# Patient Record
Sex: FEMALE | URBAN - METROPOLITAN AREA
[De-identification: names, ages, dates, MRNs, and addresses within clinical notes are randomized per-mention and may not be internally consistent; named-entity substitution may affect disease eponyms.]

---

## 2018-02-22 ENCOUNTER — RECORDS - HEALTHEAST (OUTPATIENT)
Dept: LAB | Facility: CLINIC | Age: 78
End: 2018-02-22

## 2018-02-22 LAB
ANION GAP SERPL CALCULATED.3IONS-SCNC: 10 MMOL/L (ref 5–18)
BUN SERPL-MCNC: 20 MG/DL (ref 8–28)
CALCIUM SERPL-MCNC: 8.9 MG/DL (ref 8.5–10.5)
CHLORIDE BLD-SCNC: 100 MMOL/L (ref 98–107)
CO2 SERPL-SCNC: 26 MMOL/L (ref 22–31)
CREAT SERPL-MCNC: 0.9 MG/DL (ref 0.6–1.1)
ERYTHROCYTE [DISTWIDTH] IN BLOOD BY AUTOMATED COUNT: 13 % (ref 11–14.5)
GFR SERPL CREATININE-BSD FRML MDRD: >60 ML/MIN/1.73M2
GLUCOSE BLD-MCNC: 126 MG/DL (ref 70–125)
HCT VFR BLD AUTO: 37.8 % (ref 35–47)
HGB BLD-MCNC: 12.2 G/DL (ref 12–16)
MCH RBC QN AUTO: 30 PG (ref 27–34)
MCHC RBC AUTO-ENTMCNC: 32.3 G/DL (ref 32–36)
MCV RBC AUTO: 93 FL (ref 80–100)
PLATELET # BLD AUTO: 274 THOU/UL (ref 140–440)
PMV BLD AUTO: 10.8 FL (ref 8.5–12.5)
POTASSIUM BLD-SCNC: 3.9 MMOL/L (ref 3.5–5)
RBC # BLD AUTO: 4.07 MILL/UL (ref 3.8–5.4)
SODIUM SERPL-SCNC: 136 MMOL/L (ref 136–145)
WBC: 6.8 THOU/UL (ref 4–11)

## 2018-07-19 ENCOUNTER — RECORDS - HEALTHEAST (OUTPATIENT)
Dept: LAB | Facility: CLINIC | Age: 78
End: 2018-07-19

## 2018-07-19 LAB
ANION GAP SERPL CALCULATED.3IONS-SCNC: 8 MMOL/L (ref 5–18)
BUN SERPL-MCNC: 24 MG/DL (ref 8–28)
CALCIUM SERPL-MCNC: 9.5 MG/DL (ref 8.5–10.5)
CHLORIDE BLD-SCNC: 105 MMOL/L (ref 98–107)
CO2 SERPL-SCNC: 26 MMOL/L (ref 22–31)
CREAT SERPL-MCNC: 1 MG/DL (ref 0.6–1.1)
ERYTHROCYTE [DISTWIDTH] IN BLOOD BY AUTOMATED COUNT: 13.4 % (ref 11–14.5)
GFR SERPL CREATININE-BSD FRML MDRD: 54 ML/MIN/1.73M2
GLUCOSE BLD-MCNC: 70 MG/DL (ref 70–125)
HCT VFR BLD AUTO: 39.8 % (ref 35–47)
HGB BLD-MCNC: 13.2 G/DL (ref 12–16)
MCH RBC QN AUTO: 30.4 PG (ref 27–34)
MCHC RBC AUTO-ENTMCNC: 33.2 G/DL (ref 32–36)
MCV RBC AUTO: 92 FL (ref 80–100)
PLATELET # BLD AUTO: 249 THOU/UL (ref 140–440)
PMV BLD AUTO: 9.5 FL (ref 8.5–12.5)
POTASSIUM BLD-SCNC: 4.2 MMOL/L (ref 3.5–5)
RBC # BLD AUTO: 4.34 MILL/UL (ref 3.8–5.4)
SODIUM SERPL-SCNC: 139 MMOL/L (ref 136–145)
TSH SERPL DL<=0.005 MIU/L-ACNC: 0.16 UIU/ML (ref 0.3–5)
WBC: 7.2 THOU/UL (ref 4–11)

## 2018-07-20 ENCOUNTER — RECORDS - HEALTHEAST (OUTPATIENT)
Dept: LAB | Facility: CLINIC | Age: 78
End: 2018-07-20

## 2018-07-20 LAB
25(OH)D3 SERPL-MCNC: 22.8 NG/ML (ref 30–80)
T3FREE SERPL-MCNC: 2.4 PG/ML (ref 1.9–3.9)
T4 FREE SERPL-MCNC: 0.9 NG/DL (ref 0.7–1.8)

## 2018-11-19 ENCOUNTER — RECORDS - HEALTHEAST (OUTPATIENT)
Dept: LAB | Facility: CLINIC | Age: 78
End: 2018-11-19

## 2018-11-20 LAB
ANION GAP SERPL CALCULATED.3IONS-SCNC: 11 MMOL/L (ref 5–18)
BUN SERPL-MCNC: 29 MG/DL (ref 8–28)
CALCIUM SERPL-MCNC: 9.8 MG/DL (ref 8.5–10.5)
CHLORIDE BLD-SCNC: 96 MMOL/L (ref 98–107)
CO2 SERPL-SCNC: 27 MMOL/L (ref 22–31)
CREAT SERPL-MCNC: 1.1 MG/DL (ref 0.6–1.1)
GFR SERPL CREATININE-BSD FRML MDRD: 48 ML/MIN/1.73M2
GLUCOSE BLD-MCNC: 112 MG/DL (ref 70–125)
POTASSIUM BLD-SCNC: 3.9 MMOL/L (ref 3.5–5)
SODIUM SERPL-SCNC: 134 MMOL/L (ref 136–145)

## 2019-01-17 ENCOUNTER — RECORDS - HEALTHEAST (OUTPATIENT)
Dept: LAB | Facility: CLINIC | Age: 79
End: 2019-01-17

## 2019-01-17 LAB
ALBUMIN SERPL-MCNC: 3.6 G/DL (ref 3.5–5)
ALP SERPL-CCNC: 57 U/L (ref 45–120)
ALT SERPL W P-5'-P-CCNC: 18 U/L (ref 0–45)
ANION GAP SERPL CALCULATED.3IONS-SCNC: 13 MMOL/L (ref 5–18)
AST SERPL W P-5'-P-CCNC: 20 U/L (ref 0–40)
BILIRUB SERPL-MCNC: 0.6 MG/DL (ref 0–1)
BUN SERPL-MCNC: 35 MG/DL (ref 8–28)
CALCIUM SERPL-MCNC: 9.8 MG/DL (ref 8.5–10.5)
CHLORIDE BLD-SCNC: 97 MMOL/L (ref 98–107)
CO2 SERPL-SCNC: 27 MMOL/L (ref 22–31)
CREAT SERPL-MCNC: 1.23 MG/DL (ref 0.6–1.1)
ERYTHROCYTE [DISTWIDTH] IN BLOOD BY AUTOMATED COUNT: 14.3 % (ref 11–14.5)
GFR SERPL CREATININE-BSD FRML MDRD: 42 ML/MIN/1.73M2
GLUCOSE BLD-MCNC: 109 MG/DL (ref 70–125)
HCT VFR BLD AUTO: 38.2 % (ref 35–47)
HGB BLD-MCNC: 12.2 G/DL (ref 12–16)
MCH RBC QN AUTO: 29.7 PG (ref 27–34)
MCHC RBC AUTO-ENTMCNC: 31.9 G/DL (ref 32–36)
MCV RBC AUTO: 93 FL (ref 80–100)
PLATELET # BLD AUTO: 248 THOU/UL (ref 140–440)
PMV BLD AUTO: 10.1 FL (ref 8.5–12.5)
POTASSIUM BLD-SCNC: 3.8 MMOL/L (ref 3.5–5)
PROT SERPL-MCNC: 7.4 G/DL (ref 6–8)
RBC # BLD AUTO: 4.11 MILL/UL (ref 3.8–5.4)
SODIUM SERPL-SCNC: 137 MMOL/L (ref 136–145)
TSH SERPL DL<=0.005 MIU/L-ACNC: 0.54 UIU/ML (ref 0.3–5)
VIT B12 SERPL-MCNC: 308 PG/ML (ref 213–816)
WBC: 6.2 THOU/UL (ref 4–11)

## 2019-02-14 ENCOUNTER — RECORDS - HEALTHEAST (OUTPATIENT)
Dept: LAB | Facility: CLINIC | Age: 79
End: 2019-02-14

## 2019-02-14 LAB
ALBUMIN SERPL-MCNC: 3.5 G/DL (ref 3.5–5)
ANION GAP SERPL CALCULATED.3IONS-SCNC: 13 MMOL/L (ref 5–18)
BUN SERPL-MCNC: 26 MG/DL (ref 8–28)
CALCIUM SERPL-MCNC: 9.2 MG/DL (ref 8.5–10.5)
CHLORIDE BLD-SCNC: 100 MMOL/L (ref 98–107)
CO2 SERPL-SCNC: 26 MMOL/L (ref 22–31)
CREAT SERPL-MCNC: 1.1 MG/DL (ref 0.6–1.1)
ERYTHROCYTE [DISTWIDTH] IN BLOOD BY AUTOMATED COUNT: 14.6 % (ref 11–14.5)
GFR SERPL CREATININE-BSD FRML MDRD: 48 ML/MIN/1.73M2
GLUCOSE BLD-MCNC: 94 MG/DL (ref 70–125)
HCT VFR BLD AUTO: 37.3 % (ref 35–47)
HGB BLD-MCNC: 12.1 G/DL (ref 12–16)
MCH RBC QN AUTO: 30.7 PG (ref 27–34)
MCHC RBC AUTO-ENTMCNC: 32.4 G/DL (ref 32–36)
MCV RBC AUTO: 95 FL (ref 80–100)
PHOSPHATE SERPL-MCNC: 4.3 MG/DL (ref 2.5–4.5)
PLATELET # BLD AUTO: 234 THOU/UL (ref 140–440)
PMV BLD AUTO: 9.9 FL (ref 8.5–12.5)
POTASSIUM BLD-SCNC: 4.2 MMOL/L (ref 3.5–5)
PTH-INTACT SERPL-MCNC: 46 PG/ML (ref 10–86)
RBC # BLD AUTO: 3.94 MILL/UL (ref 3.8–5.4)
SODIUM SERPL-SCNC: 139 MMOL/L (ref 136–145)
WBC: 5.4 THOU/UL (ref 4–11)

## 2019-03-20 ENCOUNTER — RECORDS - HEALTHEAST (OUTPATIENT)
Dept: LAB | Facility: CLINIC | Age: 79
End: 2019-03-20

## 2019-03-20 LAB
ANION GAP SERPL CALCULATED.3IONS-SCNC: 10 MMOL/L (ref 5–18)
BUN SERPL-MCNC: 39 MG/DL (ref 8–28)
CALCIUM SERPL-MCNC: 9.6 MG/DL (ref 8.5–10.5)
CHLORIDE BLD-SCNC: 96 MMOL/L (ref 98–107)
CO2 SERPL-SCNC: 30 MMOL/L (ref 22–31)
CREAT SERPL-MCNC: 1.21 MG/DL (ref 0.6–1.1)
GFR SERPL CREATININE-BSD FRML MDRD: 43 ML/MIN/1.73M2
GLUCOSE BLD-MCNC: 83 MG/DL (ref 70–125)
POTASSIUM BLD-SCNC: 3.9 MMOL/L (ref 3.5–5)
SODIUM SERPL-SCNC: 136 MMOL/L (ref 136–145)

## 2019-04-25 ENCOUNTER — RECORDS - HEALTHEAST (OUTPATIENT)
Dept: LAB | Facility: CLINIC | Age: 79
End: 2019-04-25

## 2019-04-25 LAB
ALBUMIN SERPL-MCNC: 3.6 G/DL (ref 3.5–5)
ALP SERPL-CCNC: 52 U/L (ref 45–120)
ALT SERPL W P-5'-P-CCNC: 17 U/L (ref 0–45)
ANION GAP SERPL CALCULATED.3IONS-SCNC: 11 MMOL/L (ref 5–18)
AST SERPL W P-5'-P-CCNC: 17 U/L (ref 0–40)
BILIRUB SERPL-MCNC: 0.5 MG/DL (ref 0–1)
BUN SERPL-MCNC: 45 MG/DL (ref 8–28)
C REACTIVE PROTEIN LHE: 0.1 MG/DL (ref 0–0.8)
CALCIUM SERPL-MCNC: 9.8 MG/DL (ref 8.5–10.5)
CHLORIDE BLD-SCNC: 98 MMOL/L (ref 98–107)
CO2 SERPL-SCNC: 27 MMOL/L (ref 22–31)
CREAT SERPL-MCNC: 1.69 MG/DL (ref 0.6–1.1)
ERYTHROCYTE [DISTWIDTH] IN BLOOD BY AUTOMATED COUNT: 13.3 % (ref 11–14.5)
GFR SERPL CREATININE-BSD FRML MDRD: 29 ML/MIN/1.73M2
GLUCOSE BLD-MCNC: 107 MG/DL (ref 70–125)
HCT VFR BLD AUTO: 37.6 % (ref 35–47)
HGB BLD-MCNC: 12.2 G/DL (ref 12–16)
MCH RBC QN AUTO: 31 PG (ref 27–34)
MCHC RBC AUTO-ENTMCNC: 32.4 G/DL (ref 32–36)
MCV RBC AUTO: 96 FL (ref 80–100)
PLATELET # BLD AUTO: 277 THOU/UL (ref 140–440)
PMV BLD AUTO: 9.8 FL (ref 8.5–12.5)
POTASSIUM BLD-SCNC: 4.1 MMOL/L (ref 3.5–5)
PROT SERPL-MCNC: 7.1 G/DL (ref 6–8)
RBC # BLD AUTO: 3.93 MILL/UL (ref 3.8–5.4)
SODIUM SERPL-SCNC: 136 MMOL/L (ref 136–145)
WBC: 6.4 THOU/UL (ref 4–11)

## 2019-07-18 ENCOUNTER — RECORDS - HEALTHEAST (OUTPATIENT)
Dept: LAB | Facility: CLINIC | Age: 79
End: 2019-07-18

## 2019-07-18 LAB
ALBUMIN SERPL-MCNC: 3.9 G/DL (ref 3.5–5)
ANION GAP SERPL CALCULATED.3IONS-SCNC: 12 MMOL/L (ref 5–18)
BUN SERPL-MCNC: 27 MG/DL (ref 8–28)
CALCIUM SERPL-MCNC: 9.7 MG/DL (ref 8.5–10.5)
CHLORIDE BLD-SCNC: 94 MMOL/L (ref 98–107)
CO2 SERPL-SCNC: 29 MMOL/L (ref 22–31)
CREAT SERPL-MCNC: 1.17 MG/DL (ref 0.6–1.1)
ERYTHROCYTE [DISTWIDTH] IN BLOOD BY AUTOMATED COUNT: 13 % (ref 11–14.5)
GFR SERPL CREATININE-BSD FRML MDRD: 45 ML/MIN/1.73M2
GLUCOSE BLD-MCNC: 88 MG/DL (ref 70–125)
HCT VFR BLD AUTO: 38 % (ref 35–47)
HGB BLD-MCNC: 12.1 G/DL (ref 12–16)
MCH RBC QN AUTO: 30.8 PG (ref 27–34)
MCHC RBC AUTO-ENTMCNC: 31.8 G/DL (ref 32–36)
MCV RBC AUTO: 97 FL (ref 80–100)
PHOSPHATE SERPL-MCNC: 3.6 MG/DL (ref 2.5–4.5)
PLATELET # BLD AUTO: 266 THOU/UL (ref 140–440)
PMV BLD AUTO: 9.9 FL (ref 8.5–12.5)
POTASSIUM BLD-SCNC: 4.1 MMOL/L (ref 3.5–5)
PTH-INTACT SERPL-MCNC: 56 PG/ML (ref 10–86)
RBC # BLD AUTO: 3.93 MILL/UL (ref 3.8–5.4)
SODIUM SERPL-SCNC: 135 MMOL/L (ref 136–145)
WBC: 6.4 THOU/UL (ref 4–11)

## 2019-07-19 LAB — 25(OH)D3 SERPL-MCNC: 36.3 NG/ML (ref 30–80)

## 2019-11-02 ENCOUNTER — APPOINTMENT (OUTPATIENT)
Dept: CT IMAGING | Facility: CLINIC | Age: 79
DRG: 684 | End: 2019-11-02
Attending: EMERGENCY MEDICINE
Payer: MEDICARE

## 2019-11-02 ENCOUNTER — HOSPITAL ENCOUNTER (INPATIENT)
Facility: CLINIC | Age: 79
LOS: 2 days | Discharge: NURSING, REHAB OR LONG-TERM CARE | DRG: 684 | End: 2019-11-04
Attending: EMERGENCY MEDICINE | Admitting: INTERNAL MEDICINE
Payer: MEDICARE

## 2019-11-02 ENCOUNTER — APPOINTMENT (OUTPATIENT)
Dept: GENERAL RADIOLOGY | Facility: CLINIC | Age: 79
DRG: 684 | End: 2019-11-02
Attending: EMERGENCY MEDICINE
Payer: MEDICARE

## 2019-11-02 DIAGNOSIS — R00.1 SINUS BRADYCARDIA: ICD-10-CM

## 2019-11-02 DIAGNOSIS — B35.4 TINEA CORPORIS: Primary | ICD-10-CM

## 2019-11-02 DIAGNOSIS — R60.0 LOWER EXTREMITY EDEMA: ICD-10-CM

## 2019-11-02 DIAGNOSIS — N17.9 AKI (ACUTE KIDNEY INJURY) (H): ICD-10-CM

## 2019-11-02 DIAGNOSIS — I10 ESSENTIAL HYPERTENSION: ICD-10-CM

## 2019-11-02 PROBLEM — F03.90 DEMENTIA (H): Status: ACTIVE | Noted: 2019-11-02

## 2019-11-02 PROBLEM — R60.9 EDEMA: Status: ACTIVE | Noted: 2019-11-02

## 2019-11-02 LAB
ALBUMIN SERPL-MCNC: 3.5 G/DL (ref 3.4–5)
ALBUMIN UR-MCNC: NEGATIVE MG/DL
ANION GAP SERPL CALCULATED.3IONS-SCNC: 7 MMOL/L (ref 3–14)
APPEARANCE UR: CLEAR
BASOPHILS # BLD AUTO: 0 10E9/L (ref 0–0.2)
BASOPHILS NFR BLD AUTO: 0.3 %
BILIRUB UR QL STRIP: NEGATIVE
BUN SERPL-MCNC: 47 MG/DL (ref 7–30)
CALCIUM SERPL-MCNC: 9 MG/DL (ref 8.5–10.1)
CHLORIDE SERPL-SCNC: 96 MMOL/L (ref 94–109)
CO2 BLDCOV-SCNC: 29 MMOL/L (ref 21–28)
CO2 SERPL-SCNC: 31 MMOL/L (ref 20–32)
COLOR UR AUTO: NORMAL
CREAT SERPL-MCNC: 1.66 MG/DL (ref 0.52–1.04)
DIFFERENTIAL METHOD BLD: ABNORMAL
EOSINOPHIL # BLD AUTO: 0.2 10E9/L (ref 0–0.7)
EOSINOPHIL NFR BLD AUTO: 3.1 %
ERYTHROCYTE [DISTWIDTH] IN BLOOD BY AUTOMATED COUNT: 13.5 % (ref 10–15)
GFR SERPL CREATININE-BSD FRML MDRD: 29 ML/MIN/{1.73_M2}
GLUCOSE SERPL-MCNC: 113 MG/DL (ref 70–99)
GLUCOSE UR STRIP-MCNC: NEGATIVE MG/DL
HCT VFR BLD AUTO: 33 % (ref 35–47)
HEMOCCULT STL QL: NEGATIVE
HGB BLD-MCNC: 10.8 G/DL (ref 11.7–15.7)
HGB UR QL STRIP: NEGATIVE
IMM GRANULOCYTES # BLD: 0 10E9/L (ref 0–0.4)
IMM GRANULOCYTES NFR BLD: 0.2 %
KETONES UR STRIP-MCNC: NEGATIVE MG/DL
LACTATE BLD-SCNC: 0.5 MMOL/L (ref 0.7–2.1)
LEUKOCYTE ESTERASE UR QL STRIP: NEGATIVE
LYMPHOCYTES # BLD AUTO: 1.3 10E9/L (ref 0.8–5.3)
LYMPHOCYTES NFR BLD AUTO: 20.6 %
MCH RBC QN AUTO: 31.2 PG (ref 26.5–33)
MCHC RBC AUTO-ENTMCNC: 32.7 G/DL (ref 31.5–36.5)
MCV RBC AUTO: 95 FL (ref 78–100)
MONOCYTES # BLD AUTO: 0.6 10E9/L (ref 0–1.3)
MONOCYTES NFR BLD AUTO: 9.5 %
NEUTROPHILS # BLD AUTO: 4.1 10E9/L (ref 1.6–8.3)
NEUTROPHILS NFR BLD AUTO: 66.3 %
NITRATE UR QL: NEGATIVE
NRBC # BLD AUTO: 0 10*3/UL
NRBC BLD AUTO-RTO: 0 /100
NT-PROBNP SERPL-MCNC: 466 PG/ML (ref 0–1800)
PCO2 BLDV: 49 MM HG (ref 40–50)
PH BLDV: 7.37 PH (ref 7.32–7.43)
PH UR STRIP: 7 PH (ref 5–7)
PLATELET # BLD AUTO: 245 10E9/L (ref 150–450)
PO2 BLDV: 24 MM HG (ref 25–47)
POTASSIUM SERPL-SCNC: 3.9 MMOL/L (ref 3.4–5.3)
RBC # BLD AUTO: 3.46 10E12/L (ref 3.8–5.2)
RBC #/AREA URNS AUTO: 1 /HPF (ref 0–2)
SAO2 % BLDV FROM PO2: 40 %
SODIUM SERPL-SCNC: 134 MMOL/L (ref 133–144)
SOURCE: NORMAL
SP GR UR STRIP: 1.01 (ref 1–1.03)
SQUAMOUS #/AREA URNS AUTO: <1 /HPF (ref 0–1)
TROPONIN I SERPL-MCNC: <0.015 UG/L (ref 0–0.04)
UROBILINOGEN UR STRIP-MCNC: NORMAL MG/DL (ref 0–2)
WBC # BLD AUTO: 6.1 10E9/L (ref 4–11)
WBC #/AREA URNS AUTO: 1 /HPF (ref 0–5)

## 2019-11-02 PROCEDURE — 82272 OCCULT BLD FECES 1-3 TESTS: CPT | Performed by: EMERGENCY MEDICINE

## 2019-11-02 PROCEDURE — 93005 ELECTROCARDIOGRAM TRACING: CPT

## 2019-11-02 PROCEDURE — 80048 BASIC METABOLIC PNL TOTAL CA: CPT | Performed by: EMERGENCY MEDICINE

## 2019-11-02 PROCEDURE — 82803 BLOOD GASES ANY COMBINATION: CPT

## 2019-11-02 PROCEDURE — 81001 URINALYSIS AUTO W/SCOPE: CPT | Performed by: EMERGENCY MEDICINE

## 2019-11-02 PROCEDURE — 82040 ASSAY OF SERUM ALBUMIN: CPT | Performed by: INTERNAL MEDICINE

## 2019-11-02 PROCEDURE — 99223 1ST HOSP IP/OBS HIGH 75: CPT | Mod: AI | Performed by: INTERNAL MEDICINE

## 2019-11-02 PROCEDURE — 71250 CT THORAX DX C-: CPT

## 2019-11-02 PROCEDURE — 84484 ASSAY OF TROPONIN QUANT: CPT | Performed by: EMERGENCY MEDICINE

## 2019-11-02 PROCEDURE — 82040 ASSAY OF SERUM ALBUMIN: CPT | Performed by: EMERGENCY MEDICINE

## 2019-11-02 PROCEDURE — 25800030 ZZH RX IP 258 OP 636: Performed by: INTERNAL MEDICINE

## 2019-11-02 PROCEDURE — 25000132 ZZH RX MED GY IP 250 OP 250 PS 637: Mod: GY | Performed by: INTERNAL MEDICINE

## 2019-11-02 PROCEDURE — 83605 ASSAY OF LACTIC ACID: CPT

## 2019-11-02 PROCEDURE — 99285 EMERGENCY DEPT VISIT HI MDM: CPT | Mod: 25

## 2019-11-02 PROCEDURE — 85025 COMPLETE CBC W/AUTO DIFF WBC: CPT | Performed by: EMERGENCY MEDICINE

## 2019-11-02 PROCEDURE — 83880 ASSAY OF NATRIURETIC PEPTIDE: CPT | Performed by: EMERGENCY MEDICINE

## 2019-11-02 PROCEDURE — 71046 X-RAY EXAM CHEST 2 VIEWS: CPT

## 2019-11-02 PROCEDURE — 12000000 ZZH R&B MED SURG/OB

## 2019-11-02 RX ORDER — ESCITALOPRAM OXALATE 10 MG/1
15 TABLET ORAL DAILY
COMMUNITY

## 2019-11-02 RX ORDER — ACETAMINOPHEN 325 MG/1
650 TABLET ORAL EVERY 4 HOURS PRN
Status: DISCONTINUED | OUTPATIENT
Start: 2019-11-02 | End: 2019-11-04 | Stop reason: HOSPADM

## 2019-11-02 RX ORDER — CHLORTHALIDONE 50 MG/1
50 TABLET ORAL DAILY
Status: ON HOLD | COMMUNITY
End: 2019-11-04

## 2019-11-02 RX ORDER — NALOXONE HYDROCHLORIDE 0.4 MG/ML
.1-.4 INJECTION, SOLUTION INTRAMUSCULAR; INTRAVENOUS; SUBCUTANEOUS
Status: DISCONTINUED | OUTPATIENT
Start: 2019-11-02 | End: 2019-11-04 | Stop reason: HOSPADM

## 2019-11-02 RX ORDER — SODIUM CHLORIDE 9 MG/ML
INJECTION, SOLUTION INTRAVENOUS CONTINUOUS
Status: DISCONTINUED | OUTPATIENT
Start: 2019-11-02 | End: 2019-11-04

## 2019-11-02 RX ORDER — DOCUSATE SODIUM 100 MG/1
100 CAPSULE, LIQUID FILLED ORAL 2 TIMES DAILY
Status: DISCONTINUED | OUTPATIENT
Start: 2019-11-02 | End: 2019-11-04 | Stop reason: HOSPADM

## 2019-11-02 RX ORDER — AMMONIUM LACTATE 12 G/100G
LOTION TOPICAL DAILY
COMMUNITY

## 2019-11-02 RX ORDER — LIDOCAINE 40 MG/G
CREAM TOPICAL
Status: DISCONTINUED | OUTPATIENT
Start: 2019-11-02 | End: 2019-11-04 | Stop reason: HOSPADM

## 2019-11-02 RX ORDER — ONDANSETRON 4 MG/1
4 TABLET, ORALLY DISINTEGRATING ORAL EVERY 6 HOURS PRN
Status: DISCONTINUED | OUTPATIENT
Start: 2019-11-02 | End: 2019-11-04 | Stop reason: HOSPADM

## 2019-11-02 RX ORDER — PANTOPRAZOLE SODIUM 40 MG/1
40 TABLET, DELAYED RELEASE ORAL DAILY
Status: DISCONTINUED | OUTPATIENT
Start: 2019-11-03 | End: 2019-11-04 | Stop reason: HOSPADM

## 2019-11-02 RX ORDER — PANTOPRAZOLE SODIUM 40 MG/1
40 TABLET, DELAYED RELEASE ORAL DAILY
COMMUNITY

## 2019-11-02 RX ORDER — LOSARTAN POTASSIUM 50 MG/1
50 TABLET ORAL DAILY
Status: ON HOLD | COMMUNITY
End: 2019-11-04

## 2019-11-02 RX ORDER — ACETAMINOPHEN 325 MG/1
650 TABLET ORAL EVERY 4 HOURS PRN
COMMUNITY

## 2019-11-02 RX ORDER — METOPROLOL SUCCINATE 50 MG/1
75 TABLET, EXTENDED RELEASE ORAL DAILY
Status: ON HOLD | COMMUNITY
End: 2019-11-04

## 2019-11-02 RX ORDER — ONDANSETRON 2 MG/ML
4 INJECTION INTRAMUSCULAR; INTRAVENOUS EVERY 6 HOURS PRN
Status: DISCONTINUED | OUTPATIENT
Start: 2019-11-02 | End: 2019-11-04 | Stop reason: HOSPADM

## 2019-11-02 RX ORDER — CHOLECALCIFEROL (VITAMIN D3) 50 MCG
2000 TABLET ORAL DAILY
COMMUNITY

## 2019-11-02 RX ORDER — FUROSEMIDE 20 MG
10 TABLET ORAL DAILY
Status: ON HOLD | COMMUNITY
End: 2019-11-04

## 2019-11-02 RX ORDER — ACETAMINOPHEN 325 MG/1
650 TABLET ORAL DAILY
Status: ON HOLD | COMMUNITY
End: 2019-11-04

## 2019-11-02 RX ADMIN — SODIUM CHLORIDE: 9 INJECTION, SOLUTION INTRAVENOUS at 17:17

## 2019-11-02 RX ADMIN — ACETAMINOPHEN 650 MG: 325 TABLET, FILM COATED ORAL at 20:03

## 2019-11-02 RX ADMIN — DOCUSATE SODIUM 100 MG: 100 CAPSULE, LIQUID FILLED ORAL at 20:03

## 2019-11-02 RX ADMIN — MICONAZOLE NITRATE: 20 POWDER TOPICAL at 21:24

## 2019-11-02 RX ADMIN — QUETIAPINE 12.5 MG: 25 TABLET, FILM COATED ORAL at 21:43

## 2019-11-02 ASSESSMENT — MIFFLIN-ST. JEOR
SCORE: 1166.28
SCORE: 1048.34

## 2019-11-02 ASSESSMENT — ACTIVITIES OF DAILY LIVING (ADL): ADLS_ACUITY_SCORE: 15.5

## 2019-11-02 NOTE — H&P
[facility]    History and Physical  Hospitalist       Date of Admission:  11/2/2019    Assessment & Plan   79 year old female with past medical hx of advanced dementia, lives at [facility], who was noticed to have a HR in the 40's today and sent to ER for evaluation:     Summary:    Principal Problem:    GAYLE (acute kidney injury) (H)  Active Problems:    HTN (hypertension)    Hyperlipidemia LDL goal <130    Advanced Dementia (H)    Edema    Bradycardia       Plan:  Admit to medical floor, IV fluids, hold diuretics, check Albumin (suspect low albumin cause of leg swelling), hold Metoprolol.  Discussed with daughter -- DNR/DNI, and may consider do not hospitalize on discharge.     DVT Prophylaxis: Pneumatic Compression Devices  Code Status: DNR / DNI    Disposition: Expected discharge in 2 days if HR improves and kidney function improves.     Steve Jay MD  Pager: [phone]  Cell Phone:  [phone]     Primary Care Physician   Radha Harper    Chief Complaint   Bradycardia    History is obtained from Patient's daughter, and  staff    History of Present Illness   79 year old female with history of dementia, hypertension and hyperlipidemia who presents to the emergency department today with generalized weakness. The patient arrived by EMS from a memory care facility where staff stated that there was an increase in generalized weakness, somnolent and leg swelling. When EMS arrived, she was back to baseline. They denies fever, cough, abdominal pain or leg swelling.     Have noticed HR in the 40's.  She has a poor appetite, but persistent leg swelling -- so increased diuretics.  She is on Metoprolol for her BP.  She is unable to give any answers.  He daughter says she doesn't always know her when she visits her at the [facility].     PAST MEDICAL HISTORY    Past Medical History:   Diagnosis Date     Advanced Dementia (H)      Arthritis     osteoarthritis throughout body     History of  "blood transfusion     \"I think I did when I had my daughter maybe\"     HTN (hypertension)      Hyperlipidemia      Numbness and tingling     TINGLING IN RIGHT LEG     Polio     at age 12, has slight rt leg weakness         PAST SURGICAL HISTORY    Past Surgical History:   Procedure Laterality Date     ARTHROPLASTY HIP  1/27/2014    Procedure: ARTHROPLASTY HIP;  RIGHT TOTAL HIP ARTHROPLASTY (MILENA)^;  Surgeon: Marco A Retana MD;  Location:  OR     C ANESTH,TOTAL HIP ARTHROPLASTY      left 2008      HRW ANES TOTAL KNEE REPLACEMENT, MDA 1      left knee         Prior to Admission Medications   Prior to Admission Medications   Prescriptions Last Dose Informant Patient Reported? Taking?   acetaminophen (TYLENOL) 325 MG tablet 11/2/2019 at [facility] [place] Yes Yes   Sig: Take 650 mg by mouth daily   acetaminophen (TYLENOL) 325 MG tablet  at [facility] Yes Yes   Sig: Take 650 mg by mouth every 4 hours as needed for mild pain   ammonium lactate (LAC-HYDRIN) 12 % external lotion 11/2/2019 at [address] [place] [address] Yes Yes   Sig: Apply topically daily Apply to leg(s)   chlorthalidone (HYGROTON) 50 MG tablet 11/2/2019 at [address] [place] [address] Yes Yes   Sig: Take 50 mg by mouth daily   escitalopram (LEXAPRO) 10 MG tablet 11/2/2019 at [facility] [place] Yes Yes   Sig: Take 15 mg by mouth daily   furosemide (LASIX) 20 MG tablet 11/2/2019 at [address] [place] [address] Yes Yes   Sig: Take 10 mg by mouth daily   losartan (COZAAR) 50 MG tablet 11/2/2019 at [facility] Yes Yes   Sig: Take 50 mg by mouth daily   metoprolol succinate ER (TOPROL-XL) 50 MG 24 hr tablet 11/2/2019 at [address] [place] [address] Yes Yes   Sig: Take 75 mg by mouth daily   pantoprazole (PROTONIX) 40 MG EC tablet 11/2/2019 at [facility] Yes Yes   Sig: Take 40 mg by mouth daily   vitamin D3 (CHOLECALCIFEROL) 2000 units (50 mcg) tablet 11/2/2019 at [address] [place] [address] Yes Yes   Sig: Take 2,000 Units by mouth daily      Facility-Administered Medications: None "     Allergies   No Known Allergies    SOCIAL HISTORY    Social History     Patient does not qualify to have social determinant information on file (likely too young).   Social History Narrative    , 2 kids, retired, lives at the [facility], and her  lives there as well -- he has Parkinson's and mild dementia, and is not to be involved with his wife's care.  The son and daughter are Medical POA, and she is DNR/DNI.  (last updated 11/2/2019)       Social History     Tobacco Use     Smoking status: Never Smoker     Smokeless tobacco: Never Used   Substance Use Topics     Alcohol use: Not Currently     Drug use: No        FAMILY HISTORY    Family History   Problem Relation Age of Onset     Neurologic Disorder Mother         dementia, at age 93      Diabetes No family hx of      C.A.D. No family hx of      Cancer No family hx of      Cancer - colorectal No family hx of      Breast Cancer No family hx of         Review of Systems   Review of systems not obtained due to patient factors - confusion      PHYSICAL EXAM     Temp: 97.2  F (36.2  C) Temp src: Oral BP: 120/50 Pulse: (!) 45 Heart Rate: (!) 46 Resp: 11 SpO2: 94 % O2 Device: None (Room air)    Vital Signs with Ranges  Temp:  [97.2  F (36.2  C)] 97.2  F (36.2  C)  Pulse:  [38-57] 45  Heart Rate:  [38-55] 46  Resp:  [10-17] 11  BP: (120-163)/() 120/50  SpO2:  [94 %-99 %] 94 %  150 lbs 0 oz    Constitutional: Awake, alert, speaks softly and mumbles  Eyes: Conjunctiva and pupils examined and normal.  HEENT: Moist mucous membranes, normal dentition.  Respiratory: Clear to auscultation bilaterally, no crackles or wheezing.  Cardiovascular: Regular rate and rhythm, normal S1 and S2, and no murmur noted, no carotid bruits.  1-2+ bilateral ankle edema.   GI: Soft, non-distended, non-tender, normal bowel sounds.  Lymph/Hematologic: No anterior cervical, supraclavicular or axillary adenopathy.  Skin: No rashes, no cyanosis.   Musculoskeletal: No joint swelling,  erythema or tenderness.  Neurologic: Alert, Ox0.5 (can't say her name, does not know her daughter's name), generalized weakness  Psychiatric:  Eyes closed, initially frightened when awoke, then smiled    Data   Data reviewed today:  I personally reviewed the EKG tracing showing sinus shweta.  Recent Labs   Lab 11/02/19  1141   WBC 6.1   HGB 10.8*   MCV 95         POTASSIUM 3.9   CHLORIDE 96   CO2 31   BUN 47*   CR 1.66*   ANIONGAP 7   DALILA 9.0   *   TROPI <0.015       Imaging:  Recent Results (from the past 24 hour(s))   Chest XR,  PA & LAT    Narrative    CHEST TWO VIEWS  11/2/2019 12:10 PM     HISTORY: leg swelling    COMPARISON: 1/28/2014 chest x-ray      Impression    IMPRESSION: Subtle oval right upper lobe opacity 1.5 x 0.5 cm in size  is indeterminate but new. The lungs are otherwise clear, heart and  pulmonary vessels within normal limits. No evidence for pleural  effusion. Calcification in the thoracic aorta.    MARGO CORDERO MD   Chest CT w/o contrast    Narrative    CT CHEST WITHOUT CONTRAST   11/2/2019 1:10 PM    HISTORY: Mass lesion noted on chest x-ray.    TECHNIQUE: Volumetric helical acquisition was performed from the  thoracic inlet through the upper abdomen without IV contrast. Coronal  images were also reconstructed from the axial data. Radiation dose for  this scan was reduced using automated exposure control, adjustment of  the mA and/or kV according to patient size, or iterative  reconstruction technique.    COMPARISON: Chest radiograph performed earlier today.    FINDINGS: There is mild scarring and/or atelectasis in the right  middle lobe and lingula. There are a few tiny calcified granulomas in  the right lung. A few small indeterminate nodular opacities in the  right lung measure 0.5 cm or smaller. No lung masses are identified.  Mild emphysematous changes are noted in both upper lungs.     No pleural or pericardial effusions. Atherosclerotic calcification of  the  thoracic aorta and coronary arteries. No enlarged lymph nodes are  identified in the chest. Indeterminate left thyroid nodule measures  1.4 cm.     Small hiatal hernia. A small amount of sludge and/or stone material is  noted within the gallbladder. The gallbladder is distended. Cyst in  the upper pole of the left kidney anteriorly is partially included on  this exam, and measures up to 4.7 cm in diameter. Limited noncontrast  views of the upper abdomen are otherwise unremarkable.       Impression    IMPRESSION:   1. No lung mass is identified. The radiographic finding may correspond  to a tiny calcified granuloma in this location.  2. Scattered tiny indeterminate pulmonary nodules in the right lung  measure 0.4 cm or smaller. Please refer to pulmonary nodule follow-up  guidelines below.  3. Indeterminate 1.4 cm left thyroid nodule.  4. There is sludge and/or stones within the gallbladder, and the  gallbladder is distended. If there is clinical suspicion for  cholecystitis, gallbladder ultrasound should be considered for further  evaluation.    HALLIE PACHECO MD

## 2019-11-02 NOTE — ED NOTES
Bed: ED20  Expected date: 11/2/19  Expected time: 11:21 AM  Means of arrival: Ambulance  Comments:  Anthony 79 roberto, shweta ETA 1124

## 2019-11-02 NOTE — ED NOTES
Observation Brochure and Video    Patient and family informed of observation status based on provider's order.  Observation brochure was given and the video watched. Patient/Family stated understanding. Questions answered.  Keren Bañuelos RN

## 2019-11-02 NOTE — ED TRIAGE NOTES
Pt from the [facility] - memory care Hx of Dementia   Staff called EMS for decrease LOC generalized weakness julio leg swelling   Pt at baseline upon EMS arrival

## 2019-11-02 NOTE — ED NOTES
"[facility]  ED Nurse Handoff Report    ED Chief complaint: Generalized Weakness      ED Diagnosis:   Final diagnoses:   None       Code Status: See Prior/POA    Allergies: No Known Allergies    Activity level - Baseline/Home:  Stand with Assist  Activity Level - Current:   Stand with Assist of 2    Patient's Preferred language: English   Needed?: No    Isolation: No  Infection: Not Applicable  Bariatric?: No    Vital Signs:   Vitals:    11/02/19 1415 11/02/19 1430 11/02/19 1445 11/02/19 1500   BP: 139/62 133/69 (!) 150/63 (!) 143/92   Pulse: (!) 48 (!) 46 (!) 38 (!) 49   Resp: 16 13 10 12   Temp:       TempSrc:       SpO2: 98% 97% 96% 97%   Weight:       Height:           Cardiac Rhythm: ,   Cardiac  Cardiac Rhythm: Sinus bradycardia    Pain level: 0-10 Pain Scale: 0    Is this patient confused?: Yes   Does this patient have a guardian?  Yes         If yes, is there guardianship documents in the Epic \"Code/ACP\" activity?  Yes         Guardian Notified?  Yes  [place] - Suicide Severity Rating Scale Completed?  Yes  If yes, what color did the patient score?  N/A    Patient Report: Initial Complaint: General Weakness  Focused Assessment:  Desiree Silva is a 79 year old female with history of dementia, hypertension and hyperlipidemia who presents to the emergency department today with generalized weakness. The patient arrived by EMS from a memory care facility where staff stated that there was an increase in generalized weakness, somnolent and leg swelling. When EMS arrived, she was back to baseline. They denies fever, cough, abdominal pain or leg swelling.  Tests Performed: Ct/Xray  Abnormal Results:   Labs Ordered and Resulted from Time of ED Arrival Up to the Time of Departure from the ED   CBC WITH PLATELETS DIFFERENTIAL - Abnormal; Notable for the following components:       Result Value    RBC Count 3.46 (*)     Hemoglobin 10.8 (*)     Hematocrit 33.0 (*)     All other components " within normal limits   BASIC METABOLIC PANEL - Abnormal; Notable for the following components:    Glucose 113 (*)     Urea Nitrogen 47 (*)     Creatinine 1.66 (*)     GFR Estimate 29 (*)     GFR Estimate If Black 34 (*)     All other components within normal limits   ISTAT  GASES LACTATE VALERIO POCT - Abnormal; Notable for the following components:    PO2 Venous 24 (*)     Bicarbonate Venous 29 (*)     Lactic Acid 0.5 (*)     All other components within normal limits   TROPONIN I   ROUTINE UA WITH MICROSCOPIC   NT PROBNP INPATIENT   OCCULT BLOOD STOOL       Treatments provided: monitored and fluids    Family Comments: Family has been contacted Saundra pappas Emergency Contact and POA-Please call with updates    OBS brochure/video discussed/provided to patient/family: Yes              Name of person given brochure if not patient: Desiree              Relationship to patient: Self and Patient, She has memory issues, was contacted by MD about admission to observation    ED Medications: Medications - No data to display    Drips infusing?:  No    For the majority of the shift this patient was Green.   Interventions performed were na.    Severe Sepsis OR Septic Shock Diagnosis Present: No    To be done/followed up on inpatient unit:  na    ED NURSE PHONE NUMBER: *[phone]

## 2019-11-02 NOTE — PROGRESS NOTES
RECEIVING UNIT ED HANDOFF REVIEW    ED Nurse Handoff Report was reviewed by: Gissell Faulkner RN on November 2, 2019 at 4:44 PM

## 2019-11-02 NOTE — PHARMACY-ADMISSION MEDICATION HISTORY
Pharmacy Medication History  Admission medication history interview status for the 11/2/2019  admission is complete. See EPIC admission navigator for prior to admission medications     Medication history sources: MAR ([facility] ([phone]).)  Also spoke w/ RN at facility to determine last dose administrations.    Medication history source reliability: Good  Adherence assessment: Good    Significant changes made to the medication list:  None    Additional medication history information:   None    Medication reconciliation completed by provider prior to medication history? No    Time spent in this activity: 15 minutes      Prior to Admission medications    Medication Sig Last Dose Taking? Auth Provider   acetaminophen (TYLENOL) 325 MG tablet Take 650 mg by mouth daily 11/2/2019 at 0800 Yes Unknown, Entered By History   acetaminophen (TYLENOL) 325 MG tablet Take 650 mg by mouth every 4 hours as needed for mild pain  at prn Yes Unknown, Entered By History   ammonium lactate (LAC-HYDRIN) 12 % external lotion Apply topically daily Apply to leg(s) 11/2/2019 at 0800 Yes Unknown, Entered By History   chlorthalidone (HYGROTON) 50 MG tablet Take 50 mg by mouth daily 11/2/2019 at 0800 Yes Unknown, Entered By History   escitalopram (LEXAPRO) 10 MG tablet Take 15 mg by mouth daily 11/2/2019 at 0800 Yes Unknown, Entered By History   furosemide (LASIX) 20 MG tablet Take 10 mg by mouth daily 11/2/2019 at 0800 Yes Unknown, Entered By History   losartan (COZAAR) 50 MG tablet Take 50 mg by mouth daily 11/2/2019 at 0800 Yes Unknown, Entered By History   metoprolol succinate ER (TOPROL-XL) 50 MG 24 hr tablet Take 75 mg by mouth daily 11/2/2019 at 0800 Yes Unknown, Entered By History   pantoprazole (PROTONIX) 40 MG EC tablet Take 40 mg by mouth daily 11/2/2019 at 0800 Yes Unknown, Entered By History   vitamin D3 (CHOLECALCIFEROL) 2000 units (50 mcg) tablet Take 2,000 Units by mouth daily 11/2/2019 at 0800 Yes Unknown, Entered  By History     Merlyn Busch, PharmD, BCPS

## 2019-11-02 NOTE — ED PROVIDER NOTES
History     Chief Complaint:  Generalized Weakness     The history is limited by the condition of the patient.     Desiree Silva is a 79 year old female with history of dementia, hypertension and hyperlipidemia who presents to the emergency department today with generalized weakness. The patient arrived by EMS from a memory care facility where staff stated that there was an increase in generalized weakness, increased fatigue, and increased difficulty walking with increased leg swelling. When EMS arrived, she was back to baseline.  No reported history of fever, abdominal pain, chest pain, or focal neurologic deficit.    Allergies:  No Known Allergies     Medications:    Fosamax  Ascorbic acid  Fergon  Cozaar  Percocet  Zocor  [place]  Zinc    Past Medical History:    Arthritis   History of blood transfusion   HTN (hypertension)   Hyperlipidemia   Numbness and tingling   Polio  Ulcer  Dementia  Pelvis fracture     Past Surgical History:    Hip arthroplasty  Knee replacement     Family History:    Mother - dementia    Social History:  The patient was accompanied to the ED by EMS.  Smoking Status: Current  Smokeless Tobacco: Never  Alcohol Use: Yes   Drug Use: No   PCP: Radha Harper   Marital Status:       Review of Systems   Unable to perform ROS: Mental status change      Physical Exam     Patient Vitals for the past 24 hrs:   BP Temp Temp src Pulse Heart Rate Resp SpO2 Height Weight   11/02/19 1500 (!) 143/92 -- -- (!) 49 50 12 97 % -- --   11/02/19 1445 (!) 150/63 -- -- (!) 38 (!) 38 10 96 % -- --   11/02/19 1430 133/69 -- -- (!) 46 (!) 46 13 97 % -- --   11/02/19 1415 139/62 -- -- (!) 48 (!) 44 16 98 % -- --   11/02/19 1400 (!) 152/71 -- -- 56 55 15 -- -- --   11/02/19 1345 (!) 141/78 -- -- 57 (!) 47 16 97 % -- --   11/02/19 1330 (!) 129/116 -- -- 51 (!) 45 17 96 % -- --   11/02/19 1315 (!) 145/84 -- -- (!) 43 (!) 43 10 98 % -- --   11/02/19 1300 (!) 155/74 -- -- (!) 45 (!) 46 10 -- -- --   11/02/19 1243  "(!) 142/87 -- -- 54 50 15 99 % -- --   11/02/19 1230 (!) 163/90 -- -- (!) 43 (!) 42 11 99 % -- --   11/02/19 1219 (!) 156/61 -- -- 51 54 16 -- -- --   11/02/19 1145 -- -- -- -- 51 14 97 % -- --   11/02/19 1140 (!) 150/81 97.2  F (36.2  C) Oral (!) 48 -- 16 97 % 1.651 m (5' 5\") 68 kg (150 lb)        Physical Exam   Left breast intertrigo    General: Alert and cooperative with exam. Patient in mild distress. Baseline mentation, history of dementia.  Head:  Scalp is NC/AT  Eyes:  No scleral icterus, PERRL, EOMI  ENT:  The external nose and ears are normal. The oropharynx is normal and without erythema; mucus membranes are moist. Uvula midline, no evidence of deep space infection.  Neck:  Normal range of motion without rigidity.  CV:  bradycardic, regular rhythm  Resp:  Breath sounds are clear bilaterally    Non-labored, no retractions or accessory muscle use  GI:  Abdomen is soft, no distension, no tenderness. No peritoneal signs  MS:  +1 pitting edema to lower extremities with venous stasis changes.   Skin:  Warm and dry, No rash or lesions noted.  Neuro: Oriented to person only. No gross motor deficits.      Emergency Department Course     EKG:  Indication: weakness  Time: 1157  Vent. Rate 49 bpm. IL interval 200. QRS duration 86. QT/QTc 506/457. P-R-T axis 77 18 65.  Sinus bradycardia with sinus arrhythmia. Abnormal ECG.  Read time: 1209    Imaging:  Radiology results were communicated with the patient who voiced understanding of the findings.    XR Chest PA and LAT:   IMPRESSION: Subtle oval right upper lobe opacity 1.5 x 0.5 cm in size  is indeterminate but new. The lungs are otherwise clear, heart and  pulmonary vessels within normal limits. No evidence for pleural  effusion. Calcification in the thoracic aorta, as per radiology.    CT Chest w/o contrast:   IMPRESSION:   1. No lung mass is identified. The radiographic finding may correspond  to a tiny calcified granuloma in this location.  2. Scattered tiny " indeterminate pulmonary nodules in the right lung  measure 0.4 cm or smaller. Please refer to pulmonary nodule follow-up  guidelines below.  3. Indeterminate 1.4 cm left thyroid nodule.  4. There is sludge and/or stones within the gallbladder, and the  gallbladder is distended. If there is clinical suspicion for  cholecystitis, gallbladder ultrasound should be considered for further  Evaluation, as per radiology.    Laboratory:  Laboratory results were communicated with the patient who voiced understanding of the findings.    CBC: WBC: 6.1, HGB: 10.8 (L), PLT: 245  BMP: Glucose: 113 (H), BUN: 47 (H), Creatinine: 1.66 (H), GFR: 29 (L), o/w WNL  1141 Troponin: <0.015  BNP: 466  UA: Negative   Occult blood stool: negative    ISTAT VBG: pH 7.37 / PCO2 49 / PO2 24 (L) / Bicarb 29 (H) / O2 sat 40 / lactic acid 0.5 (L)    Emergency Department Course:  Nursing notes and vitals reviewed. (11:42 AM) I performed an exam of the patient as documented above.     IV inserted. Blood drawn. This was sent to the lab for further testing, results above.     The patient was sent for XR while in the emergency department, results above.     1253 I rechecked the patient and discussed the results of their workup thus far. Rectal exam was performed here in the emergency department with a chaperone at bedside.    1454 I rechecked the patient.    1459 I consulted with the patient's daughter, Saundra, regarding the patient's history and presentation here in the emergency department.    1515 I consulted with Dr. Jay of the hospitalist services. They are in agreement to accept the patient for admission.     Findings and plan explained to the Patient who consents to admission. Discussed the patient with Dr. Jay, who will admit the patient to a obs bed for further monitoring, evaluation, and treatment.    Impression & Plan       Medical Decision Making:  Patient is a 79-year-old female who presents from skilled care facility/memory care due  to report of increased lower extremity edema and increased difficult walking/generalized weakness.  Patient's medical history and records were reviewed.  Initial consideration for, but not limited to, CHF exacerbation, arrhythmia, atypical ACS/MI, infectious process, electrolyte abnormality, metabolic disturbance, among others.  Labs, EKG, and imaging was obtained.  Chest x-ray did demonstrate a subtle opacity over the right upper lobe but otherwise clear exam.  To further evaluate this finding, a CT chest without contrast was obtained and shows no evidence of mass but rather calcified granuloma, thyroid nodule (patient's daughter informed), and sludge/stones within the gallbladder (patient without significant abdominal tenderness).  Patient was noted to be bradycardic throughout ED course and EKG does demonstrate sinus bradycardia without evidence of acute ischemia, infarction, or other significant arrhythmia.  Labs notable for evidence of acute kidney injury (creatinine 1.66).  Troponin and BNP not significantly elevated and patient denies chest pain or shortness of breath.  She is noted to have +1 pitting edema to her lower extremities without evidence of infectious process.  Patient does have some mild intertrigo under her left breast that may benefit from antifungal therapy well in the hospital.  Given acute kidney injury as well as bradycardia of undetermined significance with lower extremity edema, I will admit the patient to observation with the hospitalist service for further evaluation and care.  Patient's ED care and findings were discussed with her daughter (Saundra Silva) who is in agreement with the plan as stated above.  Patient remained stable throughout ED course.    Diagnosis:    ICD-10-CM    1. Sinus bradycardia R00.1    2. GAYLE (acute kidney injury) (H) N17.9    3. Lower extremity edema R60.0       Disposition:  Admitted to obs w/ Dr. Loo.     Scribe Disclosure:  Sai HINDS, am serving as  a scribe at 11:42 AM on 11/2/2019 to document services personally performed by Hamilton Avery DO, based on my observations and the provider's statements to me.      11/2/2019    EMERGENCY DEPARTMENT    Saundra Silva: Patient's Daughter & Power of   Cell: [phone]  Home: [phone]       Hamilton Avery,   11/02/19 1651       Hamilton Avery,   11/02/19 1651

## 2019-11-03 LAB
ANION GAP SERPL CALCULATED.3IONS-SCNC: 3 MMOL/L (ref 3–14)
BUN SERPL-MCNC: 33 MG/DL (ref 7–30)
CALCIUM SERPL-MCNC: 8.3 MG/DL (ref 8.5–10.1)
CHLORIDE SERPL-SCNC: 105 MMOL/L (ref 94–109)
CO2 SERPL-SCNC: 29 MMOL/L (ref 20–32)
CREAT SERPL-MCNC: 1.31 MG/DL (ref 0.52–1.04)
GFR SERPL CREATININE-BSD FRML MDRD: 39 ML/MIN/{1.73_M2}
GLUCOSE SERPL-MCNC: 101 MG/DL (ref 70–99)
POTASSIUM SERPL-SCNC: 3.9 MMOL/L (ref 3.4–5.3)
SODIUM SERPL-SCNC: 137 MMOL/L (ref 133–144)

## 2019-11-03 PROCEDURE — 25000132 ZZH RX MED GY IP 250 OP 250 PS 637: Mod: GY | Performed by: INTERNAL MEDICINE

## 2019-11-03 PROCEDURE — 25800030 ZZH RX IP 258 OP 636: Performed by: INTERNAL MEDICINE

## 2019-11-03 PROCEDURE — 80048 BASIC METABOLIC PNL TOTAL CA: CPT | Performed by: INTERNAL MEDICINE

## 2019-11-03 PROCEDURE — 12000000 ZZH R&B MED SURG/OB

## 2019-11-03 PROCEDURE — 99232 SBSQ HOSP IP/OBS MODERATE 35: CPT | Performed by: INTERNAL MEDICINE

## 2019-11-03 PROCEDURE — 36415 COLL VENOUS BLD VENIPUNCTURE: CPT | Performed by: INTERNAL MEDICINE

## 2019-11-03 RX ADMIN — SODIUM CHLORIDE: 9 INJECTION, SOLUTION INTRAVENOUS at 18:45

## 2019-11-03 RX ADMIN — DOCUSATE SODIUM 100 MG: 100 CAPSULE, LIQUID FILLED ORAL at 20:09

## 2019-11-03 RX ADMIN — ESCITALOPRAM 15 MG: 5 TABLET, FILM COATED ORAL at 14:30

## 2019-11-03 RX ADMIN — DOCUSATE SODIUM 100 MG: 100 CAPSULE, LIQUID FILLED ORAL at 09:43

## 2019-11-03 RX ADMIN — PANTOPRAZOLE SODIUM 40 MG: 40 TABLET, DELAYED RELEASE ORAL at 09:43

## 2019-11-03 RX ADMIN — Medication 1 MG: at 22:17

## 2019-11-03 ASSESSMENT — ACTIVITIES OF DAILY LIVING (ADL)
ADLS_ACUITY_SCORE: 27
ADLS_ACUITY_SCORE: 33
ADLS_ACUITY_SCORE: 31
ADLS_ACUITY_SCORE: 27
ADLS_ACUITY_SCORE: 16.5
ADLS_ACUITY_SCORE: 27

## 2019-11-03 NOTE — PLAN OF CARE
Cognitive Concerns/ Orientation :  A and O x1 only, hx of dementia, intermittent agitation with combativeness  BEHAVIOR & AGGRESSION TOOL COLOR:  Green to yellow  CIWA SCORE: NA  ABNL VS/O2: VSS, HR was shweta in the ED, now 62  MOBILITY:  Up with 2 assist, GB to C  PAIN MANAGMENT:  Tylenol PRN  DIET: Dysphagia 2 , thin liquids  BOWEL/BLADDER:  Usually incontinent per nurse at pt's Memory Care unit..   ABNL LAB/BG: creat 1.66  DRAIN/DEVICES: periph IV for IVF  TELEMETRY RHYTHM:  NA  SKIN: yeast rash under left breast, purple/red pressure area on upper buttocks fold  TESTS/PROCEDURES:  AM labs  D/C DAY/GOALS/PLACE:  Return to Memory Care pending improvement of creat, weakness  OTHER IMPORTANT INFO:  Pt slept most of the shift.

## 2019-11-03 NOTE — PLAN OF CARE
DATE & TIME: 11-2-19 9317-7991   Cognitive Concerns/ Orientation :  A and O x1 only, hx of dementia, intermittent agitation with combativeness  BEHAVIOR & AGGRESSION TOOL COLOR:  Green to yellow  CIWA SCORE: NA  ABNL VS/O2: VSS, HR was shweta in the ED, now 62  MOBILITY:  Up with 2 assist, GB to BSC  PAIN MANAGMENT:  Tylenol PRN  DIET: Dysphagia 2 , thin liquids  BOWEL/BLADDER:  Usually incontinent per nurse at pt's Memory Care unit. Voided x2 on BSC.   ABNL LAB/BG: creat 1.66  DRAIN/DEVICES: periph IV for IVF  TELEMETRY RHYTHM:  NA  SKIN: yeast rash under left breast, purple/red pressure area on upper buttocks fold  TESTS/PROCEDURES:  AM labs  D/C DAY/GOALS/PLACE:  Return to Memory Care pending improvement of creat, weakness  OTHER IMPORTANT INFO:  Pt. usually ambulates independently at Memory care, has a hx of falling. Bedside sitter for safety. Pt. Unable to tolerate Bryon hose, wearing bilat PCDs.

## 2019-11-03 NOTE — PROGRESS NOTES
[facility]    Hospitalist Progress Note    Assessment & Plan   79 year old female who was admitted on 11/2/2019 with decreased LOC, bradycardia, and GAYLE:    Impression:   Principal Problem:    GAYLE (acute kidney injury) -- probably related to poor oral intake and increased diuretics   -- improving with hydration      Bradycardia -- suspect related to decreased renal clearance of Metoprolol   -- improving with Metoprolol held    Active Problems:    HTN (hypertension)      Hyperlipidemia LDL goal <130      Advanced Dementia       Edema -- will treat with support hose         Plan:  Updated daughter, plan return to LTC tomorrow, and daughter will discuss with NH option of do not hospitalize, unless needed for comfort.     DVT Prophylaxis: Pneumatic Compression Devices  Code Status: DNR/DNI    Disposition: Expected discharge tomorrow back to LTC.    Steve Jay MD  Pager [phone]  Cell Phone [phone]  Text Page (7am to 6pm)    Interval History   Today awake and talking, but still quite confused.     Physical Exam   Temp: 97.8  F (36.6  C) Temp src: Axillary BP: 120/64 Pulse: 57 Heart Rate: 52 Resp: 16 SpO2: 96 % O2 Device: None (Room air)    Vitals:    11/02/19 1140 11/02/19 1713   Weight: 68 kg (150 lb) 62.6 kg (138 lb)     Vital Signs with Ranges  Temp:  [96.3  F (35.7  C)-97.8  F (36.6  C)] 97.8  F (36.6  C)  Pulse:  [57-58] 57  Heart Rate:  [52-63] 52  Resp:  [16] 16  BP: (104-146)/(47-66) 120/64  SpO2:  [94 %-96 %] 96 %  I/O last 3 completed shifts:  In: 1472 [P.O.:400; I.V.:1072]  Out: 1950 [Urine:1950]    # Pain Assessment:  Current Pain Score 11/2/2019   Pain score (0-10) -   Pain location -   Pain descriptors -   rFLACC pain score 4   Desiree banda pain level was assessed and she currently denies pain.        Constitutional: Awake, alert, cooperative, no apparent distress  Respiratory: Clear to auscultation bilaterally, no crackles or wheezing  Cardiovascular: Regular rate and  rhythm, normal S1 and S2, and no murmur noted  GI: Normal bowel sounds, soft, non-distended, non-tender  Extrem: No calf tenderness, 1+ ankle edema  Neuro: Ox1, no focal motor or sensory deficits    Medications     sodium chloride 75 mL/hr at 11/03/19 1431       docusate sodium  100 mg Oral BID     escitalopram  15 mg Oral Daily     pantoprazole  40 mg Oral Daily     sodium chloride (PF)  3 mL Intracatheter Q8H       Data   Recent Labs   Lab 11/03/19  1258 11/02/19  1141   WBC  --  6.1   HGB  --  10.8*   MCV  --  95   PLT  --  245    134   POTASSIUM 3.9 3.9   CHLORIDE 105 96   CO2 29 31   BUN 33* 47*   CR 1.31* 1.66*   ANIONGAP 3 7   DALILA 8.3* 9.0   * 113*   ALBUMIN  --  3.5   TROPI  --  <0.015       Imaging:   No results found for this or any previous visit (from the past 24 hour(s)).

## 2019-11-04 VITALS
DIASTOLIC BLOOD PRESSURE: 95 MMHG | HEART RATE: 56 BPM | HEIGHT: 61 IN | WEIGHT: 138 LBS | SYSTOLIC BLOOD PRESSURE: 160 MMHG | RESPIRATION RATE: 16 BRPM | TEMPERATURE: 97.2 F | BODY MASS INDEX: 26.06 KG/M2 | OXYGEN SATURATION: 96 %

## 2019-11-04 LAB
CREAT SERPL-MCNC: 1.13 MG/DL (ref 0.52–1.04)
GFR SERPL CREATININE-BSD FRML MDRD: 47 ML/MIN/{1.73_M2}
HGB BLD-MCNC: 10 G/DL (ref 11.7–15.7)

## 2019-11-04 PROCEDURE — 82565 ASSAY OF CREATININE: CPT | Performed by: INTERNAL MEDICINE

## 2019-11-04 PROCEDURE — 25800030 ZZH RX IP 258 OP 636: Performed by: INTERNAL MEDICINE

## 2019-11-04 PROCEDURE — 99239 HOSP IP/OBS DSCHRG MGMT >30: CPT | Performed by: INTERNAL MEDICINE

## 2019-11-04 PROCEDURE — 85018 HEMOGLOBIN: CPT | Performed by: INTERNAL MEDICINE

## 2019-11-04 PROCEDURE — 36415 COLL VENOUS BLD VENIPUNCTURE: CPT | Performed by: INTERNAL MEDICINE

## 2019-11-04 PROCEDURE — 25000132 ZZH RX MED GY IP 250 OP 250 PS 637: Mod: GY | Performed by: INTERNAL MEDICINE

## 2019-11-04 RX ORDER — HYDROCHLOROTHIAZIDE 12.5 MG/1
25 TABLET ORAL DAILY
Status: ON HOLD
Start: 2019-11-04 | End: 2020-04-04

## 2019-11-04 RX ADMIN — SODIUM CHLORIDE: 9 INJECTION, SOLUTION INTRAVENOUS at 06:20

## 2019-11-04 RX ADMIN — ESCITALOPRAM 15 MG: 5 TABLET, FILM COATED ORAL at 09:08

## 2019-11-04 RX ADMIN — PANTOPRAZOLE SODIUM 40 MG: 40 TABLET, DELAYED RELEASE ORAL at 09:08

## 2019-11-04 RX ADMIN — DOCUSATE SODIUM 100 MG: 100 CAPSULE, LIQUID FILLED ORAL at 09:08

## 2019-11-04 ASSESSMENT — ACTIVITIES OF DAILY LIVING (ADL)
ADLS_ACUITY_SCORE: 31
ADLS_ACUITY_SCORE: 31
ADLS_ACUITY_SCORE: 18.5

## 2019-11-04 NOTE — DISCHARGE SUMMARY
[facility]    Discharge Summary  Hospitalist    Date of Admission:  11/2/2019  Date of Discharge:  11/4/2019  Discharging Provider: Steve Jay MD    Discharge Diagnoses   Principal Problem:    GAYLE (acute kidney injury) - related to dehydration   Active Problems:    HTN (hypertension)      Hyperlipidemia LDL goal <130      Advanced Dementia (H)      Edema      Bradycardia -- suspect related to Metoprolol accumulation from GAYLE      History of Present Illness   79 year old female with history of dementia, hypertension and hyperlipidemia who presents to the emergency department today with generalized weakness. The patient arrived by EMS from a memory care facility where staff stated that there was an increase in generalized weakness, somnolent and leg swelling. When EMS arrived, she was back to baseline. They denies fever, cough, abdominal pain or leg swelling.      Have noticed HR in the 40's.  She has a poor appetite, but persistent leg swelling -- so increased diuretics.  She is on Metoprolol for her BP.  She is unable to give any answers.  He daughter says she doesn't always know her when she visits her at the [facility].    Hospital Course   Admitted to med telemetry floor, given IV fluid and diuretics held and Metoprolol and Cozaar held.  After 3 days creatinine improved to 1.13, patient was mentally brighter but still confusion, Ox1, but heart rate improve to 83 and BP was 165/95. Will restart hydrochlorothiazide 25 mg daily -- would not add lasix as the combination is more potent than she needed, and given her advanced dementia I would be comfortable with systolic BP < 170 and diastolic < 90.     Did discuss long term goals with daughter, who is healthcare POA, and she is considering a Do Not Hospitalize order, unless needed for comfort, but she plans to discuss this with the care center.     Steve Jay MD, MD  Pager: [phone]  Cell Phone:  [phone]        Significant Results and Procedures   As above    Pending Results   These results will be followed up by Dr. Loo  Unresulted Labs Ordered in the Past 30 Days of this Admission     No orders found from 10/3/2019 to 11/3/2019.          Code Status   DNR / DNI       Primary Care Physician   Radha Harper    Physical Exam   Temp: 97.2  F (36.2  C) Temp src: Oral BP: (!) 160/95 Pulse: 56 Heart Rate: 83 Resp: 16 SpO2: 96 % O2 Device: None (Room air)    Vitals:    11/02/19 1140 11/02/19 1713   Weight: 68 kg (150 lb) 62.6 kg (138 lb)     Vital Signs with Ranges  Temp:  [97.2  F (36.2  C)-98  F (36.7  C)] 97.2  F (36.2  C)  Pulse:  [56-57] 56  Heart Rate:  [59-83] 83  Resp:  [16] 16  BP: (119-160)/(51-95) 160/95  SpO2:  [96 %-98 %] 96 %  I/O last 3 completed shifts:  In: 3020 [P.O.:1150; I.V.:1870]  Out: 2300 [Urine:2300]    Exam on discharge:   Lungs clear  CV with reg S1S2  Neuro -- pleasant, confused, Ox1    Discharge Disposition   Discharged to long-term care facility  Condition at discharge: Fair    Consultations This Hospital Stay   CARE TRANSITION RN/SW IP CONSULT    Time Spent on this Encounter   I spent a total of 35 minutes discharging this patient.     Discharge Orders      AntiEmbolism Stockings    Bilateral below knee length.On in the morning, off at night     General info for SNF    Length of Stay Estimate: Long Term Care: Estimated # of Days 31-90  Condition at Discharge: Improving  Level of care:skilled   Rehabilitation Potential: Fair  Admission H&P remains valid and up-to-date: Yes  Recent Chemotherapy: N/A  Use Nursing Home Standing Orders: Yes     Mantoux instructions    Give two-step Mantoux (PPD) Per Facility Policy No (if no explain) -- just there     Reason for your hospital stay    Slow heart rate related to dehydration from diuretics which let to impaired kidney function, which cause metoprolol accumulation and slow heart rate.     Additional Discharge Instructions    Call Dr. Loo if  any medical questions at Cell Phone [phone].     Follow Up and recommended labs and tests    Follow up with Nursing home physician in 1 week, check BMP in 1 week.     Activity - Up with nursing assistance     DNR/DNI     Advance Diet as Tolerated    Follow this diet upon discharge: Orders Placed This Encounter      Combination Diet Regular Diet Adult; Dysphagia Diet Level 2: Mechan Altered; Thin Liquids (water, ice chips, juice, milk gelatin, ice cream, etc)     Discharge Medications   Current Discharge Medication List      START taking these medications    Details   hydrochlorothiazide (HYDRODIURIL) 12.5 MG tablet Take 2 tablets (25 mg) by mouth daily    Associated Diagnoses: Essential hypertension      miconazole (MICATIN/MICRO GUARD) 2 % external powder Apply topically 2 times daily as needed for other (topical candidiasis)  Refills: 0    Associated Diagnoses: Tinea corporis         CONTINUE these medications which have NOT CHANGED    Details   acetaminophen (TYLENOL) 325 MG tablet Take 650 mg by mouth every 4 hours as needed for mild pain      ammonium lactate (LAC-HYDRIN) 12 % external lotion Apply topically daily Apply to leg(s)      escitalopram (LEXAPRO) 10 MG tablet Take 15 mg by mouth daily      pantoprazole (PROTONIX) 40 MG EC tablet Take 40 mg by mouth daily      vitamin D3 (CHOLECALCIFEROL) 2000 units (50 mcg) tablet Take 2,000 Units by mouth daily         STOP taking these medications       chlorthalidone (HYGROTON) 50 MG tablet Comments:   Reason for Stopping:         furosemide (LASIX) 20 MG tablet Comments:   Reason for Stopping:         losartan (COZAAR) 50 MG tablet Comments:   Reason for Stopping:         metoprolol succinate ER (TOPROL-XL) 50 MG 24 hr tablet Comments:   Reason for Stopping:             Allergies   No Known Allergies  Data   Most Recent 3 CBC's:  Recent Labs   Lab Test 11/04/19  1240 11/02/19  1141 10/11/14  0740  01/29/14  0642   WBC  --  6.1 7.4  --  10.6   HGB 10.0* 10.8*  13.2   < > 8.4*   MCV  --  95 92  --  93   PLT  --  245 258  --  171    < > = values in this interval not displayed.      Most Recent 3 BMP's:  Recent Labs   Lab Test 11/04/19  1240 11/03/19  1258 11/02/19  1141 10/11/14  0740   NA  --  137 134 139   POTASSIUM  --  3.9 3.9 3.7   CHLORIDE  --  105 96 103   CO2  --  29 31 32   BUN  --  33* 47* 13   CR 1.13* 1.31* 1.66* 0.66   ANIONGAP  --  3 7 4*   DALILA  --  8.3* 9.0 8.9   GLC  --  101* 113* 100*     Most Recent 2 LFT's:  Recent Labs   Lab Test 01/20/14  1235   AST 28   ALT 28   ALKPHOS 69   BILITOTAL 0.5     Most Recent INR's and Anticoagulation Dosing History:  Anticoagulation Dose History     Recent Dosing and Labs Latest Ref Rng & Units 9/8/2006 9/9/2006 1/27/2014 1/28/2014 1/29/2014 1/30/2014 1/31/2014    Warfarin 3 mg - - - 3 mg 3 mg 3 mg - -    Warfarin 5 mg - - - - - - 5 mg -    INR 0.86 - 1.14 1.83(H) 1.70(H) - 1.14 1.40(H) 1.25(H) 1.31(H)        Most Recent 3 Troponin's:  Recent Labs   Lab Test 11/02/19  1141   TROPI <0.015     Most Recent Cholesterol Panel:No lab results found.  Most Recent 6 Bacteria Isolates From Any Culture (See EPIC Reports for Culture Details):  Recent Labs   Lab Test 09/25/14  1400 01/28/14  1648 01/28/14  1624   CULT On day 2, isolated in broth only: Coagulase negative Staphylococcus  Critical Value/Significant Value, preliminary result only, called to and read   back by Dr. Jam Ward @6071 on 9/27/14. cms.  * No growth No growth     Most Recent TSH, T4 and A1c Labs:No lab results found.

## 2019-11-04 NOTE — PLAN OF CARE
Nurse]   DATE & TIME: 11/3 at 07-19                         Cognitive Concerns/ Orientation : confused  BEHAVIOR & AGGRESSION TOOL COLOR: green/yellow, pt will attempt to slap and pinch staff,impulsive  CIWA SCORE: na  ABNL VS/O2: vss, on room air  MOBILITY: up with 2/belt  PAIN MANAGMENT: na  DIET: DD2 with thin liquids,total feed  BOWEL/BLADDER: Incontinent at times, will use commode when offered  ABNL LAB/BG: na  DRAIN/DEVICES: na  TELEMETRY RHYTHM: na  SKIN: red under both breasts-microquard powder applied, reddish/purple area coccyx  TESTS/PROCEDURES: none  D/C DAY/GOALS/PLACE: pending improvement  OTHER IMPORTANT INFO: Pt from memory care. DNR/DNI

## 2019-11-04 NOTE — PROGRESS NOTES
Care Transition Initial Assessment -      Met with:  Spoke with daughter Saundra over the phone and two nurses at [facility].  Principal Problem:    GAYLE (acute kidney injury) (H)  Active Problems:    HTN (hypertension)    Hyperlipidemia LDL goal <130    Advanced Dementia (H)    Edema    Bradycardia       DATA  Lives With: other (see comments)(Memory Care)    Patient lives at [facility] Unit.  Insurance is Medicare and a supplement.  HCA- none listed in Epic.  Identified issues/concerns regarding health management: Patient is stable for discharge today.  Per profile patient requires assistance of one staff for all ADL's.  Writer spoke with nurse Frances at [facility] ([phone]).  She spoke with the hospital bedside nurse this morning and based on this report, she is comfortable with patient returning.   She reports patient usually walks on her own and is aware here nursing has walked patient with one or two staff.  She also reports patient not cooperating with cares is her baseline.  Writer also updated Frances that hospitalist reports patient's daughter plans to speak with [facility] staff/provider regarding option of not re-hospitalizing patient.  Frances indicates patient does have an existing POLST and that patient's PCP will be rounding on Wednesday.  She will let daughter know so that she can be at [facility] when the PCP arrives.  Writer spoke with daughter, Saundra, regarding discharge and method of transportation today.  She request hospital arrange transportation and is familiar with the cost.    Discussed with bedside nurse and patient is able to travel in a medivan.    Writer contacted [facility] and the next available w/c ride is at 1730. Daughter Saundra aware of the discharge time.  Spoke with nurse Hester at [facility] regarding transport time.  She requests patient receive her evening meal here.  ASSESSMENT  Cognitive Status:  Confused per bedside  nurses.  Concerns to be addressed: Safe discharge plan.    PLAN  Discharge today back to [facility] via [facility] at 1730.  Orders, H&P, latest progress note and MAR will be faxed to [phone].

## 2019-11-04 NOTE — CONSULTS
Care Transition Initial Assessment -      Met with:  Spoke with daughter Saundra over the phone and two nurses at The [facility].  Principal Problem:    GAYLE (acute kidney injury) (H)  Active Problems:    HTN (hypertension)    Hyperlipidemia LDL goal <130    Advanced Dementia (H)    Edema    Bradycardia       DATA  Lives With: other (see comments)(Memory Care)    Patient lives at [facility].  Insurance is Medicare and a supplement.  HCA- none listed in Epic.  Identified issues/concerns regarding health management: Patient is stable for discharge today.  Per profile patient requires assistance of one staff for all ADL's.  Writer spoke with nurse Frances at [facility] ([phone]).  She spoke with the hospital bedside nurse this morning and based on this report, she is comfortable with patient returning.   She reports patient usually walks on her own and is aware here nursing has walked patient with one or two staff.  She also reports patient not cooperating with cares is her baseline.  Writer also updated Frances that hospitalist reports patient's daughter plans to speak with [facility] staff/provider regarding option of not re-hospitalizing patient.  Frances indicates patient does have an existing POLST and that patient's PCP will be rounding on Wednesday.  She will let daughter know so that she can be at [facility] when the PCP arrives.  Writer spoke with daughter, Saundra, regarding discharge and method of transportation today.  She request hospital arrange transportation and is familiar with the cost.    Discussed with bedside nurse and patient is able to travel in a medivan.    Writer contacted [facility] and the next available w/c ride is at 1730. Daughter Saundra aware of the discharge time.  Spoke with nurse Hester at [facility] regarding transport time.  She requests patient receive her evening meal here.  ASSESSMENT  Cognitive Status:  Confused per bedside  nurses.  Concerns to be addressed: Safe discharge plan.    PLAN  Discharge today back to [facility] via [facility] at 1730.  Orders, H&P, latest progress note and MAR will be faxed to [phone].

## 2019-11-04 NOTE — PLAN OF CARE
Cognitive Concerns/ Orientation : Confused  BEHAVIOR & AGGRESSION TOOL COLOR: Green  CIWA SCORE: N/A               ABNL VS/O2: VSS 98% room air   MOBILITY: Assist of one and walker/gait belt   PAIN MANAGMENT: Denies   DIET: Regular/Dys 2 mech alt thin liquids   BOWEL/BLADDER: Incontinent of urine at times   ABNL LAB/BG: Creat 1.31  DRAIN/DEVICES: Peripheral IV infusing NS 75cc per hour   TELEMETRY RHYTHM: N/A   SKIN: Bruising, red under breasts, microguard powder applied, non blanchable redness coccyx   TESTS/PROCEDURES: N/A   D/C DAY/GOALS/PLACE: Pending   OTHER IMPORTANT INFO: DNR-DNI.  +2 to +3 lower extremity edema, patient refusing APRIL hose and PCD's.

## 2019-11-08 LAB — INTERPRETATION ECG - MUSE: NORMAL

## 2019-11-12 ENCOUNTER — RECORDS - HEALTHEAST (OUTPATIENT)
Dept: LAB | Facility: CLINIC | Age: 79
End: 2019-11-12

## 2019-11-12 LAB
ANION GAP SERPL CALCULATED.3IONS-SCNC: 11 MMOL/L (ref 5–18)
BUN SERPL-MCNC: 23 MG/DL (ref 8–28)
CALCIUM SERPL-MCNC: 9.3 MG/DL (ref 8.5–10.5)
CHLORIDE BLD-SCNC: 96 MMOL/L (ref 98–107)
CO2 SERPL-SCNC: 28 MMOL/L (ref 22–31)
CREAT SERPL-MCNC: 1.41 MG/DL (ref 0.6–1.1)
GFR SERPL CREATININE-BSD FRML MDRD: 36 ML/MIN/1.73M2
GLUCOSE BLD-MCNC: 89 MG/DL (ref 70–125)
POTASSIUM BLD-SCNC: 4.1 MMOL/L (ref 3.5–5)
SODIUM SERPL-SCNC: 135 MMOL/L (ref 136–145)

## 2019-11-21 ENCOUNTER — RECORDS - HEALTHEAST (OUTPATIENT)
Dept: LAB | Facility: CLINIC | Age: 79
End: 2019-11-21

## 2019-11-21 LAB
ALBUMIN SERPL-MCNC: 3.6 G/DL (ref 3.5–5)
ANION GAP SERPL CALCULATED.3IONS-SCNC: 9 MMOL/L (ref 5–18)
BUN SERPL-MCNC: 30 MG/DL (ref 8–28)
CALCIUM SERPL-MCNC: 9.3 MG/DL (ref 8.5–10.5)
CHLORIDE BLD-SCNC: 99 MMOL/L (ref 98–107)
CO2 SERPL-SCNC: 28 MMOL/L (ref 22–31)
CREAT SERPL-MCNC: 1.68 MG/DL (ref 0.6–1.1)
GFR SERPL CREATININE-BSD FRML MDRD: 30 ML/MIN/1.73M2
GLUCOSE BLD-MCNC: 101 MG/DL (ref 70–125)
PHOSPHATE SERPL-MCNC: 3.7 MG/DL (ref 2.5–4.5)
POTASSIUM BLD-SCNC: 3.9 MMOL/L (ref 3.5–5)
SODIUM SERPL-SCNC: 136 MMOL/L (ref 136–145)

## 2020-01-28 ENCOUNTER — RECORDS - HEALTHEAST (OUTPATIENT)
Dept: LAB | Facility: CLINIC | Age: 80
End: 2020-01-28

## 2020-01-28 LAB
ALBUMIN SERPL-MCNC: 3.3 G/DL (ref 3.5–5)
ANION GAP SERPL CALCULATED.3IONS-SCNC: 11 MMOL/L (ref 5–18)
BUN SERPL-MCNC: 34 MG/DL (ref 8–28)
CALCIUM SERPL-MCNC: 9.1 MG/DL (ref 8.5–10.5)
CHLORIDE BLD-SCNC: 99 MMOL/L (ref 98–107)
CO2 SERPL-SCNC: 30 MMOL/L (ref 22–31)
CREAT SERPL-MCNC: 2.35 MG/DL (ref 0.6–1.1)
ERYTHROCYTE [DISTWIDTH] IN BLOOD BY AUTOMATED COUNT: 13.4 % (ref 11–14.5)
GFR SERPL CREATININE-BSD FRML MDRD: 20 ML/MIN/1.73M2
GLUCOSE BLD-MCNC: 75 MG/DL (ref 70–125)
HCT VFR BLD AUTO: 34.5 % (ref 35–47)
HGB BLD-MCNC: 11 G/DL (ref 12–16)
MCH RBC QN AUTO: 30.6 PG (ref 27–34)
MCHC RBC AUTO-ENTMCNC: 31.9 G/DL (ref 32–36)
MCV RBC AUTO: 96 FL (ref 80–100)
PHOSPHATE SERPL-MCNC: 4 MG/DL (ref 2.5–4.5)
PLATELET # BLD AUTO: 261 THOU/UL (ref 140–440)
PMV BLD AUTO: 9.8 FL (ref 8.5–12.5)
POTASSIUM BLD-SCNC: 4.3 MMOL/L (ref 3.5–5)
RBC # BLD AUTO: 3.59 MILL/UL (ref 3.8–5.4)
SODIUM SERPL-SCNC: 140 MMOL/L (ref 136–145)
TSH SERPL DL<=0.005 MIU/L-ACNC: 2 UIU/ML (ref 0.3–5)
WBC: 6.4 THOU/UL (ref 4–11)

## 2020-02-06 NOTE — PLAN OF CARE
DATE & TIME: 11/4/19  Cognitive Concerns/ Orientation : Alert, oriented to self, confused   BEHAVIOR & AGGRESSION TOOL COLOR: Green   CIWA SCORE: n/a  ABNL VS/O2: VSS on RA  MOBILITY: Up w/1, walker, GB   PAIN MANAGMENT: Denies pain  DIET: Regular/DD2, thin liquids; tolerating; good appetite; assisted with feeding   BOWEL/BLADDER: Up to BSC, incontinent at times   ABNL LAB/BG: n/a  DRAIN/DEVICES: PIV, NS @ 75ml/hr  TELEMETRY RHYTHM: n/a  SKIN: Bruises; red under breasts, microguard powder applied; non-blanchable redness coccyx   TESTS/PROCEDURES: n/a  D/C DAY/GOALS/PLACE: Pending, possibly back to LTC today   OTHER IMPORTANT INFO: +2-3 BLE edema, pt refusing APRIL stockings and PCDs.        Prior to immunization administration, verified patients identity using patient s name and date of birth. Please see Immunization Activity for additional information.     Screening Questionnaire for Pediatric Immunization    Is the child sick today?   No   Does the child have allergies to medications, food, a vaccine component, or latex?   No   Has the child had a serious reaction to a vaccine in the past?   No   Does the child have a long-term health problem with lung, heart, kidney or metabolic disease (e.g., diabetes), asthma, a blood disorder, no spleen, complement component deficiency, a cochlear implant, or a spinal fluid leak?  Is he/she on long-term aspirin therapy?   No   If the child to be vaccinated is 2 through 4 years of age, has a healthcare provider told you that the child had wheezing or asthma in the  past 12 months?   No   If your child is a baby, have you ever been told he or she has had intussusception?   No   Has the child, sibling or parent had a seizure, has the child had brain or other nervous system problems?   No   Does the child have cancer, leukemia, AIDS, or any immune system         problem?   No   Does the child have a parent, brother, or sister with an immune system problem?   No   In the past 3 months, has the child taken medications that affect the immune system such as prednisone, other steroids, or anticancer drugs; drugs for the treatment of rheumatoid arthritis, Crohn s disease, or psoriasis; or had radiation treatments?   No   In the past year, has the child received a transfusion of blood or blood products, or been given immune (gamma) globulin or an antiviral drug?   No   Is the child/teen pregnant or is there a chance that she could become       pregnant during the next month?   No   Has the child received any vaccinations in the past 4 weeks?   No      Immunization questionnaire answers were all negative.        [place] given to patient.    Per  orders of Dr. Haskins, injection of Gardasil9 given by Hazel Nunez CMA. Patient instructed to remain in clinic for 15 minutes afterwards, and to report any adverse reaction to me immediately.    Screening performed by Hazel Nunez CMA on 2/6/2020 at 5:26 PM.

## 2020-02-18 ENCOUNTER — RECORDS - HEALTHEAST (OUTPATIENT)
Dept: LAB | Facility: CLINIC | Age: 80
End: 2020-02-18

## 2020-02-18 LAB — HIV 1+2 AB+HIV1 P24 AG SERPL QL IA: NEGATIVE

## 2020-02-19 LAB — HBV SURFACE AG SERPL QL IA: NEGATIVE

## 2020-02-20 ENCOUNTER — RECORDS - HEALTHEAST (OUTPATIENT)
Dept: LAB | Facility: CLINIC | Age: 80
End: 2020-02-20

## 2020-02-20 LAB
ANION GAP SERPL CALCULATED.3IONS-SCNC: 8 MMOL/L (ref 5–18)
BUN SERPL-MCNC: 39 MG/DL (ref 8–28)
CALCIUM SERPL-MCNC: 9.5 MG/DL (ref 8.5–10.5)
CHLORIDE BLD-SCNC: 98 MMOL/L (ref 98–107)
CO2 SERPL-SCNC: 32 MMOL/L (ref 22–31)
CREAT SERPL-MCNC: 1.86 MG/DL (ref 0.6–1.1)
GFR SERPL CREATININE-BSD FRML MDRD: 26 ML/MIN/1.73M2
GLUCOSE BLD-MCNC: 110 MG/DL (ref 70–125)
POTASSIUM BLD-SCNC: 4.1 MMOL/L (ref 3.5–5)
SODIUM SERPL-SCNC: 138 MMOL/L (ref 136–145)

## 2020-04-02 ENCOUNTER — HOSPITAL ENCOUNTER (INPATIENT)
Facility: CLINIC | Age: 80
LOS: 1 days | Discharge: NURSING, REHAB OR LONG-TERM CARE | DRG: 699 | End: 2020-04-04
Attending: EMERGENCY MEDICINE | Admitting: INTERNAL MEDICINE
Payer: MEDICARE

## 2020-04-02 ENCOUNTER — APPOINTMENT (OUTPATIENT)
Dept: GENERAL RADIOLOGY | Facility: CLINIC | Age: 80
DRG: 699 | End: 2020-04-02
Attending: EMERGENCY MEDICINE
Payer: MEDICARE

## 2020-04-02 DIAGNOSIS — F03.91 DEMENTIA WITH BEHAVIORAL DISTURBANCE, UNSPECIFIED DEMENTIA TYPE: ICD-10-CM

## 2020-04-02 DIAGNOSIS — I10 ESSENTIAL HYPERTENSION: ICD-10-CM

## 2020-04-02 DIAGNOSIS — R50.9 FEVER, UNSPECIFIED FEVER CAUSE: ICD-10-CM

## 2020-04-02 DIAGNOSIS — N28.9 RENAL INSUFFICIENCY: ICD-10-CM

## 2020-04-02 LAB
ALBUMIN SERPL-MCNC: 3 G/DL (ref 3.4–5)
ALBUMIN UR-MCNC: 10 MG/DL
ALP SERPL-CCNC: 49 U/L (ref 40–150)
ALT SERPL W P-5'-P-CCNC: 15 U/L (ref 0–50)
ANION GAP SERPL CALCULATED.3IONS-SCNC: 5 MMOL/L (ref 3–14)
APPEARANCE UR: CLEAR
AST SERPL W P-5'-P-CCNC: 18 U/L (ref 0–45)
BASOPHILS # BLD AUTO: 0 10E9/L (ref 0–0.2)
BASOPHILS NFR BLD AUTO: 0.3 %
BILIRUB SERPL-MCNC: 0.4 MG/DL (ref 0.2–1.3)
BILIRUB UR QL STRIP: NEGATIVE
BUN SERPL-MCNC: 32 MG/DL (ref 7–30)
CALCIUM SERPL-MCNC: 8.4 MG/DL (ref 8.5–10.1)
CHLORIDE SERPL-SCNC: 105 MMOL/L (ref 94–109)
CO2 SERPL-SCNC: 31 MMOL/L (ref 20–32)
COLOR UR AUTO: YELLOW
CREAT SERPL-MCNC: 1.65 MG/DL (ref 0.52–1.04)
DIFFERENTIAL METHOD BLD: ABNORMAL
EOSINOPHIL # BLD AUTO: 0 10E9/L (ref 0–0.7)
EOSINOPHIL NFR BLD AUTO: 0 %
ERYTHROCYTE [DISTWIDTH] IN BLOOD BY AUTOMATED COUNT: 15.2 % (ref 10–15)
FLUAV+FLUBV AG SPEC QL: NEGATIVE
FLUAV+FLUBV AG SPEC QL: NEGATIVE
GFR SERPL CREATININE-BSD FRML MDRD: 29 ML/MIN/{1.73_M2}
GLUCOSE SERPL-MCNC: 90 MG/DL (ref 70–99)
GLUCOSE UR STRIP-MCNC: NEGATIVE MG/DL
HCT VFR BLD AUTO: 35.3 % (ref 35–47)
HGB BLD-MCNC: 11.3 G/DL (ref 11.7–15.7)
HGB UR QL STRIP: ABNORMAL
IMM GRANULOCYTES # BLD: 0 10E9/L (ref 0–0.4)
IMM GRANULOCYTES NFR BLD: 0.3 %
KETONES UR STRIP-MCNC: 5 MG/DL
LACTATE BLD-SCNC: 1.7 MMOL/L (ref 0.7–2)
LEUKOCYTE ESTERASE UR QL STRIP: NEGATIVE
LYMPHOCYTES # BLD AUTO: 1.4 10E9/L (ref 0.8–5.3)
LYMPHOCYTES NFR BLD AUTO: 39.5 %
MCH RBC QN AUTO: 30.2 PG (ref 26.5–33)
MCHC RBC AUTO-ENTMCNC: 32 G/DL (ref 31.5–36.5)
MCV RBC AUTO: 94 FL (ref 78–100)
MONOCYTES # BLD AUTO: 0.6 10E9/L (ref 0–1.3)
MONOCYTES NFR BLD AUTO: 16.4 %
MUCOUS THREADS #/AREA URNS LPF: PRESENT /LPF
NEUTROPHILS # BLD AUTO: 1.5 10E9/L (ref 1.6–8.3)
NEUTROPHILS NFR BLD AUTO: 43.5 %
NITRATE UR QL: NEGATIVE
NRBC # BLD AUTO: 0 10*3/UL
NRBC BLD AUTO-RTO: 0 /100
PH UR STRIP: 5.5 PH (ref 5–7)
PLATELET # BLD AUTO: 136 10E9/L (ref 150–450)
POTASSIUM SERPL-SCNC: 3.5 MMOL/L (ref 3.4–5.3)
PROT SERPL-MCNC: 7 G/DL (ref 6.8–8.8)
RBC # BLD AUTO: 3.74 10E12/L (ref 3.8–5.2)
RBC #/AREA URNS AUTO: 11 /HPF (ref 0–2)
SODIUM SERPL-SCNC: 141 MMOL/L (ref 133–144)
SOURCE: ABNORMAL
SP GR UR STRIP: 1.02 (ref 1–1.03)
SPECIMEN SOURCE: NORMAL
SQUAMOUS #/AREA URNS AUTO: 1 /HPF (ref 0–1)
UROBILINOGEN UR STRIP-MCNC: NORMAL MG/DL (ref 0–2)
WBC # BLD AUTO: 3.4 10E9/L (ref 4–11)
WBC #/AREA URNS AUTO: 2 /HPF (ref 0–5)

## 2020-04-02 PROCEDURE — 87635 SARS-COV-2 COVID-19 AMP PRB: CPT | Performed by: EMERGENCY MEDICINE

## 2020-04-02 PROCEDURE — 71045 X-RAY EXAM CHEST 1 VIEW: CPT

## 2020-04-02 PROCEDURE — 25800030 ZZH RX IP 258 OP 636: Performed by: INTERNAL MEDICINE

## 2020-04-02 PROCEDURE — 87804 INFLUENZA ASSAY W/OPTIC: CPT | Performed by: EMERGENCY MEDICINE

## 2020-04-02 PROCEDURE — 87040 BLOOD CULTURE FOR BACTERIA: CPT | Performed by: EMERGENCY MEDICINE

## 2020-04-02 PROCEDURE — 80053 COMPREHEN METABOLIC PANEL: CPT | Performed by: EMERGENCY MEDICINE

## 2020-04-02 PROCEDURE — 99219 ZZC INITIAL OBSERVATION CARE,LEVL II: CPT | Performed by: INTERNAL MEDICINE

## 2020-04-02 PROCEDURE — 99285 EMERGENCY DEPT VISIT HI MDM: CPT | Mod: 25

## 2020-04-02 PROCEDURE — 83605 ASSAY OF LACTIC ACID: CPT | Performed by: EMERGENCY MEDICINE

## 2020-04-02 PROCEDURE — 85025 COMPLETE CBC W/AUTO DIFF WBC: CPT | Performed by: EMERGENCY MEDICINE

## 2020-04-02 PROCEDURE — 25800030 ZZH RX IP 258 OP 636: Performed by: EMERGENCY MEDICINE

## 2020-04-02 PROCEDURE — G0378 HOSPITAL OBSERVATION PER HR: HCPCS

## 2020-04-02 PROCEDURE — 81001 URINALYSIS AUTO W/SCOPE: CPT | Performed by: EMERGENCY MEDICINE

## 2020-04-02 PROCEDURE — 36415 COLL VENOUS BLD VENIPUNCTURE: CPT

## 2020-04-02 RX ORDER — AMOXICILLIN 250 MG
1 CAPSULE ORAL 2 TIMES DAILY
Status: DISCONTINUED | OUTPATIENT
Start: 2020-04-02 | End: 2020-04-04 | Stop reason: HOSPADM

## 2020-04-02 RX ORDER — ACETAMINOPHEN 650 MG/1
650 SUPPOSITORY RECTAL EVERY 4 HOURS PRN
Status: DISCONTINUED | OUTPATIENT
Start: 2020-04-02 | End: 2020-04-04 | Stop reason: HOSPADM

## 2020-04-02 RX ORDER — ACETAMINOPHEN 325 MG/1
650 TABLET ORAL EVERY 4 HOURS PRN
Status: DISCONTINUED | OUTPATIENT
Start: 2020-04-02 | End: 2020-04-04 | Stop reason: HOSPADM

## 2020-04-02 RX ORDER — BISACODYL 5 MG
15 TABLET, DELAYED RELEASE (ENTERIC COATED) ORAL DAILY PRN
Status: DISCONTINUED | OUTPATIENT
Start: 2020-04-02 | End: 2020-04-04 | Stop reason: HOSPADM

## 2020-04-02 RX ORDER — AMOXICILLIN 250 MG
2 CAPSULE ORAL 2 TIMES DAILY
Status: DISCONTINUED | OUTPATIENT
Start: 2020-04-02 | End: 2020-04-04 | Stop reason: HOSPADM

## 2020-04-02 RX ORDER — QUETIAPINE FUMARATE 25 MG/1
12.5 TABLET, FILM COATED ORAL DAILY PRN
COMMUNITY

## 2020-04-02 RX ORDER — ONDANSETRON 4 MG/1
4 TABLET, ORALLY DISINTEGRATING ORAL EVERY 6 HOURS PRN
Status: DISCONTINUED | OUTPATIENT
Start: 2020-04-02 | End: 2020-04-04 | Stop reason: HOSPADM

## 2020-04-02 RX ORDER — BISACODYL 5 MG
5 TABLET, DELAYED RELEASE (ENTERIC COATED) ORAL DAILY PRN
Status: DISCONTINUED | OUTPATIENT
Start: 2020-04-02 | End: 2020-04-04 | Stop reason: HOSPADM

## 2020-04-02 RX ORDER — DIVALPROEX SODIUM 125 MG/1
250 CAPSULE, COATED PELLETS ORAL 2 TIMES DAILY
COMMUNITY

## 2020-04-02 RX ORDER — BISACODYL 5 MG
10 TABLET, DELAYED RELEASE (ENTERIC COATED) ORAL DAILY PRN
Status: DISCONTINUED | OUTPATIENT
Start: 2020-04-02 | End: 2020-04-04 | Stop reason: HOSPADM

## 2020-04-02 RX ORDER — SODIUM CHLORIDE 9 MG/ML
1000 INJECTION, SOLUTION INTRAVENOUS CONTINUOUS
Status: DISCONTINUED | OUTPATIENT
Start: 2020-04-02 | End: 2020-04-02

## 2020-04-02 RX ORDER — PANTOPRAZOLE SODIUM 40 MG/1
40 TABLET, DELAYED RELEASE ORAL DAILY
Status: DISCONTINUED | OUTPATIENT
Start: 2020-04-03 | End: 2020-04-04 | Stop reason: HOSPADM

## 2020-04-02 RX ORDER — ONDANSETRON 2 MG/ML
4 INJECTION INTRAMUSCULAR; INTRAVENOUS EVERY 6 HOURS PRN
Status: DISCONTINUED | OUTPATIENT
Start: 2020-04-02 | End: 2020-04-04 | Stop reason: HOSPADM

## 2020-04-02 RX ORDER — QUETIAPINE FUMARATE 25 MG/1
12.5 TABLET, FILM COATED ORAL 2 TIMES DAILY
COMMUNITY

## 2020-04-02 RX ORDER — NALOXONE HYDROCHLORIDE 0.4 MG/ML
.1-.4 INJECTION, SOLUTION INTRAMUSCULAR; INTRAVENOUS; SUBCUTANEOUS
Status: DISCONTINUED | OUTPATIENT
Start: 2020-04-02 | End: 2020-04-04 | Stop reason: HOSPADM

## 2020-04-02 RX ORDER — SODIUM CHLORIDE 9 MG/ML
INJECTION, SOLUTION INTRAVENOUS CONTINUOUS
Status: DISCONTINUED | OUTPATIENT
Start: 2020-04-02 | End: 2020-04-04 | Stop reason: HOSPADM

## 2020-04-02 RX ADMIN — SODIUM CHLORIDE 1000 ML: 9 INJECTION, SOLUTION INTRAVENOUS at 18:13

## 2020-04-02 RX ADMIN — SODIUM CHLORIDE: 9 INJECTION, SOLUTION INTRAVENOUS at 21:53

## 2020-04-02 ASSESSMENT — ENCOUNTER SYMPTOMS
NAUSEA: 1
COUGH: 1
FEVER: 1

## 2020-04-02 NOTE — PHARMACY-ADMISSION MEDICATION HISTORY
Pharmacy Medication History  Admission medication history interview status for the 4/2/2020  admission is complete. See EPIC admission navigator for prior to admission medications     Medication history sources: MAR ([facility] ) and Care Everywhere  Medication history source reliability: Good  Adherence assessment: Good    Significant changes made to the medication list:  Added Quetiapine      Additional medication history information:       Medication reconciliation completed by provider prior to medication history? No    Time spent in this activity: 15 min       Prior to Admission medications    Medication Sig Last Dose Taking? Auth Provider   acetaminophen (TYLENOL) 325 MG tablet Take 650 mg by mouth every 4 hours as needed for mild pain Past Month at Unknown time Yes Unknown, Entered By History   ammonium lactate (LAC-HYDRIN) 12 % external lotion Apply topically daily Apply to leg(s) 4/2/2020 at am Yes Unknown, Entered By History   divalproex sodium delayed-release (DEPAKOTE SPRINKLE) 125 MG DR capsule Take 250 mg by mouth 2 times daily 4/2/2020 at Unknown time Yes Unknown, Entered By History   escitalopram (LEXAPRO) 10 MG tablet Take 15 mg by mouth daily 4/2/2020 at am Yes Unknown, Entered By History   hydrochlorothiazide (HYDRODIURIL) 12.5 MG tablet Take 2 tablets (25 mg) by mouth daily 4/2/2020 at am Yes Steve Loo MD   miconazole (MICATIN/MICRO GUARD) 2 % external powder Apply topically 2 times daily as needed for other (topical candidiasis) Past Month at Unknown time Yes Steve Loo MD   pantoprazole (PROTONIX) 40 MG EC tablet Take 40 mg by mouth daily 4/2/2020 at am Yes Unknown, Entered By History   QUEtiapine (SEROQUEL) 25 MG tablet Take 12.5 mg by mouth 2 times daily 4/2/2020 at Unknown time Yes Unknown, Entered By History   QUEtiapine (SEROQUEL) 25 MG tablet Take 12.5 mg by mouth daily as needed Past Month at Unknown time Yes Unknown, Entered By History   vitamin D3  (CHOLECALCIFEROL) 2000 units (50 mcg) tablet Take 2,000 Units by mouth daily 4/2/2020 at Unknown time Yes Unknown, Entered By History

## 2020-04-02 NOTE — ED NOTES
Bed: ED11  Expected date: 4/2/20  Expected time: 3:45 PM  Means of arrival: Ambulance  Comments:  Anthony 80f fever, cough, ETA 1551

## 2020-04-02 NOTE — ED NOTES
[facility]  ED Nurse Handoff Report    ED Chief complaint: Fever      ED Diagnosis:   Final diagnoses:   Renal insufficiency   Dementia with behavioral disturbance, unspecified dementia type (H)   Fever, unspecified fever cause       Code Status: DNR / DNI    Allergies: No Known Allergies    Patient Story: Pt has been having fevers and more lethargic at her memory care unit.  Focused Assessment:  Pt has dementia.  Disoriented.  VSS.  Pt agitated with cares.  Pt did try to bite the MD during a procedure.  Buttocks red, but no open sores. Incontinent of urine and stool.      Treatments and/or interventions provided: 1L bolus  Patient's response to treatments and/or interventions: NA    To be done/followed up on inpatient unit:  See epic    Does this patient have any cognitive concerns?: Baseline dementia    Activity level - Baseline/Home:  SBA  Activity Level - Current:   Not tested.    Patient's Preferred language: English   Needed?: No    Isolation: None and Contact & droplet  Infection: Not Applicable- covid r/o  Other   Bariatric?: No    Vital Signs:   Vitals:    04/02/20 1558 04/02/20 1733 04/02/20 1734   BP: 130/78     Pulse: 64     Resp: 18     Temp:   99.3  F (37.4  C)   TempSrc:   Rectal   SpO2: 96% 94%        Cardiac Rhythm:     Was the PSS-3 completed:   No -baseline dementia  What interventions are required if any?               Family Comments: NA  OBS brochure/video discussed/provided to patient/family: N/A              Name of person given brochure if not patient: na              Relationship to patient: na    For the majority of the shift this patient's behavior was Green.   Behavioral interventions performed were Decreased stimulation.    ED NURSE PHONE NUMBER: [id number]

## 2020-04-02 NOTE — ED PROVIDER NOTES
History     Chief Complaint:  Fever      The history is limited by the condition of the patient.      Desiree Silva is a 80 year old female with a history of advanced dementia, HTN, HLD, who presents to the emergency department for evaluation of a fever. [facility] reports the patient had a fever at home of 100.8. She has also been experiencing a cough and increased lethargic feelings. Patient could not give history due to dementia.    Allergies:  Bupropion  Vytorin     Medications:    Tylenol  Lac-hydrin  Lexapro  Hydrodiuril  Protonix  Zocor  Hyzaar    Past Medical History:    Acute kidney injury  Advanced dementia  Edema  Bradycardia  Pelvis fracture   Osteoarthritis  HTN  HLD  Arthritis  Polio  Spinal stenosis   DJD    Past Surgical History:    ATUL left hip  TKA left knee  Colonoscopy    Family History:    Dementia - mother    Social History:  Nonsmoker  Negative for alcohol use.  Marital Status:   [2]     Review of Systems   Unable to perform ROS: Dementia   Constitutional: Positive for fever.   Respiratory: Positive for cough.    Gastrointestinal: Positive for nausea.       Physical Exam     Patient Vitals for the past 24 hrs:   BP Temp Temp src Pulse Heart Rate Resp SpO2   04/02/20 1924 -- -- -- -- -- -- 92 %   04/02/20 1734 -- 99.3  F (37.4  C) Rectal -- -- -- --   04/02/20 1733 -- -- -- -- -- -- 94 %   04/02/20 1558 130/78 -- -- 64 64 18 96 %       Physical Exam  Constitutional: Elderly white female, supine, warm to touch.  HENT: No signs of trauma.   Eyes: EOM are normal. Pupils are equal, round, and reactive to light.   Neck: Normal range of motion. No JVD present. No tracheal deviation present. No cervical adenopathy.  Cardiovascular: Regular rhythm.  Exam reveals no gallop and no friction rub.    No murmur heard.  Pulmonary/Chest: Rales right base posteriorly  Abdominal: Soft. No tenderness. No rebound or guarding.   Musculoskeletal: No tenderness. trace edema both feet.  Lymphadenopathy:  No lymphadenopathy.   Neurological: Awake and alert. No orientation. Normal strength. No facial asymmetry.  Skin: Skin is warm and dry. No rash noted. No erythema.     Emergency Department Course     Imaging:  Radiology findings were communicated with the patient who voiced understanding of the findings.    XR Chest Port 1 View   IMPRESSION: No airspace consolidation, pneumothorax, or pleural  effusion.  As per radiology.    Laboratory:  Laboratory findings were communicated with the patient who voiced understanding of the findings.    Influenza A & B Antigen: Negative.    CBC: WBC: 3.4 (L), HGB: 11.3 (L), PLT: 136 (L)  CMP: BUN 32 (H), Creatinine 1.65 (H), GFR Estimate 29 (L), Calcium 8.4 (L), Albumin 3.0 (L), o/w WNL     Lactic acid (resulted 1631): 1.7    UA with micro: URINEKETON 5, URINE BLOO Small, Protein Albumin Urine 10, RBC/HPF 11 (H), Mucous Urine Present o/w negative    COVID-10 Virus (Coronavirus) by PCR Nasopharyngeal swab: pending at time of admission.    Blood cultures: pending at time of admission.    Interventions:  1813 NS Bolus 1,000 mLs IV    Emergency Department Course:  Past medical records, nursing notes, and vitals reviewed.    1610 I performed an exam of the patient as documented above.     IV was inserted and blood was drawn for laboratory testing, results above.  The patient provided a urine sample here in the emergency department. This was sent for laboratory testing, findings above.  The patient was sent for a XR while in the emergency department, results above.     1712 I spoke with Dr. Kunz, hospitalist, who agreed to admit the patient.    1730 I rechecked the patient and discussed the results of her workup thus far.     Findings and plan explained to the Patient who consents to admission. Discussed the patient with Dr. Kunz, who will admit the patient to an obs bed for further monitoring, evaluation, and treatment.    I personally reviewed the laboratory and imaging results  with the Patient and answered all related questions prior to admission.     Impression & Plan     Covid-19  Desiree Silva was evaluated during a global COVID-19 pandemic, which necessitated consideration that the patient might be at risk for infection with the SARS-CoV-2 virus that causes COVID-19.   Applicable protocols for evaluation were followed during the patient's care.    Medical Decision Making:  Desiree Silva is a 80 year old female who presented to the ED today from [facility] care because of cough, fever, and lethargy. She has severe dementia, history of renal insufficiency and hypertension in November as cannot give any history. On exam, she feels warm although her rectal temperature is okay. She did have reported 100.8 temperature at nursing facility. Patient is not cooperative but there were no focal findings on exam. Chest XR is unremarkable. Influenza swab is negative. COVID-19 is pending. Labs reveal very mild neutropenia and anemia and her urine does not appear to show infection. Patient does have decreased renal function and she received a liter of fluids. Patient has been discussed with hospitalists and they will admit her. It is a COVID-19 rule-out for further evaluation.       Diagnosis:    ICD-10-CM    1. Renal insufficiency  N28.9 Blood culture     Blood culture   2. Dementia with behavioral disturbance, unspecified dementia type (H)  F03.91    3. Fever, unspecified fever cause  R50.9        Disposition:  Admitted to obs.    Scribe Disclosure:  Lavelle HINDS, am serving as a scribe at 4:04 PM on 4/2/2020 to document services personally performed by Rufino Dubon MD based on my observations and the provider's statements to me.        Rufino Dubon MD  04/02/20 2035

## 2020-04-03 PROBLEM — U07.1 2019 NOVEL CORONAVIRUS DISEASE (COVID-19): Status: ACTIVE | Noted: 2020-04-03

## 2020-04-03 LAB
SARS-COV-2 RNA SPEC QL NAA+PROBE: NOT DETECTED
SPECIMEN SOURCE: NORMAL

## 2020-04-03 PROCEDURE — 25000132 ZZH RX MED GY IP 250 OP 250 PS 637: Mod: GY | Performed by: INTERNAL MEDICINE

## 2020-04-03 PROCEDURE — G0378 HOSPITAL OBSERVATION PER HR: HCPCS

## 2020-04-03 PROCEDURE — 99232 SBSQ HOSP IP/OBS MODERATE 35: CPT | Performed by: INTERNAL MEDICINE

## 2020-04-03 PROCEDURE — 12000000 ZZH R&B MED SURG/OB

## 2020-04-03 PROCEDURE — 25800030 ZZH RX IP 258 OP 636: Performed by: INTERNAL MEDICINE

## 2020-04-03 RX ORDER — DIVALPROEX SODIUM 125 MG/1
250 CAPSULE, COATED PELLETS ORAL 2 TIMES DAILY
Status: DISCONTINUED | OUTPATIENT
Start: 2020-04-03 | End: 2020-04-04 | Stop reason: HOSPADM

## 2020-04-03 RX ADMIN — DIVALPROEX SODIUM 250 MG: 125 CAPSULE, COATED PELLETS ORAL at 22:23

## 2020-04-03 RX ADMIN — ESCITALOPRAM 15 MG: 5 TABLET, FILM COATED ORAL at 08:30

## 2020-04-03 RX ADMIN — QUETIAPINE 12.5 MG: 25 TABLET, FILM COATED ORAL at 21:19

## 2020-04-03 RX ADMIN — SODIUM CHLORIDE: 9 INJECTION, SOLUTION INTRAVENOUS at 16:48

## 2020-04-03 RX ADMIN — PANTOPRAZOLE SODIUM 40 MG: 40 TABLET, DELAYED RELEASE ORAL at 08:30

## 2020-04-03 RX ADMIN — SODIUM CHLORIDE: 9 INJECTION, SOLUTION INTRAVENOUS at 06:54

## 2020-04-03 RX ADMIN — Medication 1 MG: at 22:23

## 2020-04-03 ASSESSMENT — ACTIVITIES OF DAILY LIVING (ADL)
ADLS_ACUITY_SCORE: 31
ADLS_ACUITY_SCORE: 31

## 2020-04-03 NOTE — PROGRESS NOTES
Admission    Patient arrives to room 633 via cart from ED.  Care plan note: See note    Inpatient nursing criteria listed below were met:    PCD's Documented: No, not ordered   Skin issues/needs documented :Yes  Isolation education started/completed NA  Patient allergies verified with patient: Yes  Verified completion of [place] Risk Assessment Tool:  Yes  Verified completion of Guardianship screening tool: Yes  Fall Prevention: Care plan updated, Education given and documented Yes  Care Plan initiated: Yes  Home medications documented in belongings flowsheet: NA  Patient belongings documented in belongings flowsheet: Yes  Reminder note (belongings/ medications) placed in discharge instructions:NA  Admission profile/ required documentation complete: Yes  Bedside Report Letter given and explained to patient NA, Dementia

## 2020-04-03 NOTE — H&P
Admitted:     04/02/2020      CHIEF COMPLAINT:  Fever and lethargy.      HISTORY OF PRESENT ILLNESS:  Please note, this history is obtained entirely through discussion with the ER staff and chart review.  The patient herself is not cooperative.  This is a 80-year-old female who has a history of advanced dementia, who is a resident of a memory care facility, who was sent here today for decreased activity and a low-grade fever.  Per reports, she was noted to have a fever of 100.8 at her care facility.  Also per report, there has been a single case of COVID at her care facility.  The patient was evaluated in the emergency room by Dr. Dubon.  She was noted to have a number of mild abnormalities, most notably a creatinine of 1.65, up from a baseline of around 1.2.  She was tested for COVID and admission was requested for further observation.      REVIEW OF SYSTEMS:  Not obtainable.      PAST MEDICAL HISTORY:   1.  Advanced dementia.   2.  Hypertension.   3.  Hyperlipidemia.   4.  Chart documented history of polio at age 12 with lingering slight right leg weakness.   5.  History of right total hip arthroplasty.      MEDICATIONS PRIOR TO ADMISSION:   1.  Lexapro.   2.  Hydrochlorothiazide.   3.  Protonix.   4.  Acetaminophen.      SOCIAL HISTORY:  The patient is a resident of a memory care facility.  No alcohol or tobacco use.      FAMILY HISTORY:  Reviewed in the chart and noncontributory.      PHYSICAL EXAMINATION:   VITAL SIGNS:  Blood pressure 130/78, temperature 99.3, heart rate of 64.   GENERAL:  The patient is a well-nourished, elderly female.  She is not cooperative with exam, although not otherwise in obvious respiratory or pain distress.   LUNGS:  Generally clear to auscultation.   HEART:  Not examined.   ABDOMEN:  Soft, nontender.  No auscultation performed.   EXTREMITIES:  No substantial peripheral edema.   SKIN:  No notable erythema.   NEUROLOGIC:  She is oriented to self only.      IMAGING:  Chest x-ray:   Impression is no air space consolidation, pneumothorax or pleural effusion.      ASSESSMENT AND PLAN:  This is a 80-year-old female presenting for fever evaluation from a [facility] care facility, essentially admitted for COVID rule out.   1.  Fever.  The patient has not been febrile here.  She actually does not have SIRS criteria and not currently showing any signs of respiratory distress.  COVID suspicion is low, but she has been tested so she will require droplet precautions.  There has been a known exposure at her care facility.   2.  Fluids, electrolytes, nutrition.  The patient can have a diet as tolerated.  Her creatinine is up to 1.6, indicating acute kidney injury.  I will see how this responds to IV fluids.      DISPOSITION:  The patient was admitted to observation.  Can discharge back to her care facility once COVID testing is completed and her renal function approaches baseline.      CODE STATUS:  The patient has a POLST which indicates DNR/DNI; code status is DNR/DNI.      Total time spent, greater than 50% of which counseling and coordination of care, is 35 minutes.         BROOK MUELLER MD             D: 2020   T: 2020   MT: MARIE      Name:     TAD JUAREZ   MRN:      [id number]        Account:      [id number]   :      1940        Admitted:     2020                   Document: [id number]       cc: Radha Harper MD

## 2020-04-03 NOTE — PLAN OF CARE
Summary:   DATE & TIME: 4/2/20 2442-5623  Cognitive Concerns/ Orientation: Disoriented x4. Lethargic.   BEHAVIOR & AGGRESSION TOOL COLOR: Green  CIWA SCORE: NA  ABNL VS/O2: VSS on RA  MOBILITY: Ax2  PAIN MANAGMENT: No nonverbal indicators of pain.   DIET: Regular  BOWEL/BLADDER: Incontinent  ABNL LAB/BG: Cr 1.65, WBC 3.4, Hgb 11.3  DRAIN/DEVICES: R PIV infusing 100 ml/hr  TELEMETRY RHYTHM: NA  SKIN:   TESTS/PROCEDURES: COVID results pending,   D/C DAY/GOALS/PLACE: Pending COVID results and improvement. Would go back to facility.   OTHER IMPORTANT INFO: Droplet and contact precautions initiated. Sitter at bedside for aggression with cares.

## 2020-04-03 NOTE — PLAN OF CARE
DATE & TIME: 4/3/20 (2382-9883)  Cognitive Concerns/ Orientation: Alert to self.  Confused (hx of dementia)  BEHAVIOR & AGGRESSION TOOL COLOR: Green -Yellow -- becomes agitated with cares at times, refused labs this am   CIWA SCORE: NA  ABNL VS/O2: VSS on RA  MOBILITY: Up A1-2; up in chair (sitter at bedside)  PAIN MANAGMENT: No nonverbal indicators of pain.   DIET: Regular, good appetite   BOWEL/BLADDER: Incontinent B/B  ABNL LAB/BG: Cr 1.65, WBC 3.4, Hgb 11.3  DRAIN/DEVICES: R PIV infusing  ml/hr  TELEMETRY RHYTHM: NA  SKIN: Redness to coccyx  TESTS/PROCEDURES: COVID results pending  D/C DAY/GOALS/PLACE: Pending COVID results and improvement.   OTHER IMPORTANT INFO: Droplet and contact precautions initiated. Sitter at bedside for aggression with cares.

## 2020-04-03 NOTE — PROGRESS NOTES
[facility]    Hospitalist Progress Note    Assessment & Plan   Desiree Silva is a 80 year old female who was admitted on 4/2/2020.  Presenting for fever evaluation from a memory care facility, currently CO VID rule out, test pending .  Fever   ---no fever here, suspicion low, results of covid pcr pending   ---patient has leucopenia , chest x ray  Is negative   Acute renal insufficiency   ---creatinine 1.6 yesterday, basic metabolic panel ordered for am, continue hydration .    Code Status: DNR/DNI     Disposition: Expected discharge Possibly 4/4 once COVID results are obtained    Hannah Strickland MD  Text Page   (7am to 6pm)    Interval History   , She has a sitter, overnight's note noted.   patient is resting comfortably, she is on room air, denies any new complaints    -Data reviewed today: I reviewed all new labs and imaging results over the last 24 hours.     Physical Exam   Temp: 97.8  F (36.6  C) Temp src: Axillary BP: 122/70 Pulse: 64 Heart Rate: 56 Resp: 16 SpO2: 94 % O2 Device: None (Room air)    There were no vitals filed for this visit.  Vital Signs with Ranges  Temp:  [97.3  F (36.3  C)-99.3  F (37.4  C)] 97.8  F (36.6  C)  Pulse:  [64] 64  Heart Rate:  [56-64] 56  Resp:  [16-18] 16  BP: (122-160)/(70-81) 122/70  SpO2:  [91 %-96 %] 94 %  No intake/output data recorded.    Constitutional: Patient is pleasant, totally confused, oriented only to self  Respiratory: Clear to auscultation bilaterally, no crackles or wheezing  Cardiovascular: Regular rate and rhythm, normal S1 and S2, and no murmur noted  GI: Normal bowel sounds, soft, non-distended, non-tender  Skin/Integumen: No rashes, no cyanosis, no edema  Neuro : moving all 4 extremities, no focal deficit noted     Medications     sodium chloride 100 mL/hr at 04/03/20 0654       escitalopram  15 mg Oral Daily     pantoprazole  40 mg Oral Daily     senna-docusate  1 tablet Oral BID    Or     senna-docusate  2 tablet Oral BID       Data    Recent Labs   Lab 04/02/20  1631   WBC 3.4*   HGB 11.3*   MCV 94   *      POTASSIUM 3.5   CHLORIDE 105   CO2 31   BUN 32*   CR 1.65*   ANIONGAP 5   DALILA 8.4*   GLC 90   ALBUMIN 3.0*   PROTTOTAL 7.0   BILITOTAL 0.4   ALKPHOS 49   ALT 15   AST 18     Recent Labs   Lab 04/02/20  1631   GLC 90       Imaging:   Recent Results (from the past 24 hour(s))   XR Chest Port 1 View    Narrative    CHEST ONE VIEW PORTABLE   4/2/2020 5:02 PM     HISTORY: Fever    COMPARISON: None.      Impression    IMPRESSION: No airspace consolidation, pneumothorax, or pleural  effusion.    LINDA MENDOZA MD

## 2020-04-03 NOTE — PLAN OF CARE
DATE & TIME: 4/2 2300 - 4/3 5069  Cognitive Concerns/ Orientation: Alert to self. Lethargic, slept most of shift. Confused (hx of advanced dementia)  BEHAVIOR & AGGRESSION TOOL COLOR: Green -Yellow -- becomes agitated with cares  CIWA SCORE: NA  ABNL VS/O2: VSS on RA  MOBILITY: Up A1-2; Turn/Repo per pt comfort d/t aggression   PAIN MANAGMENT: No nonverbal indicators of pain.   DIET: Regular  BOWEL/BLADDER: Incontinent B/B  ABNL LAB/BG: Cr 1.65, WBC 3.4, Hgb 11.3  DRAIN/DEVICES: R PIV infusing 100 ml/hr  TELEMETRY RHYTHM: NA  SKIN: Redness to coccyx, mepilex placed.   TESTS/PROCEDURES: COVID results pending,   D/C DAY/GOALS/PLACE: Pending COVID results and improvement.   OTHER IMPORTANT INFO: Droplet and contact precautions initiated. Sitter at bedside for aggression with cares.       Observation goals: PRIOR TO DISCHARGE     -vital signs normal or at patient baseline: MET    -creatinine approaching baseline: NOT MET; baseline 1.2 and Cr level currently 1.65

## 2020-04-03 NOTE — PROGRESS NOTES
Observation goals: PRIOR TO DISCHARGE    -vital signs normal or at patient baseline: MET    -creatinine approaching baseline: NOT MET; baseline 1.2 and Cr level currently 1.65

## 2020-04-03 NOTE — PROVIDER NOTIFICATION
MD Notification    Notified Person: MD    Notified Person Name: Marco A    Notification Date/Time: 4/3/20 4:20 pm    Notification Interaction: Web based paging     Purpose of Notification: Spoke with care coordinator. Pt normally takes Depakote and Seroquel at facility. Both are mentioned in pharmacist note, but they are not ordered for Pt.     Orders Received: Pending     Comments:

## 2020-04-03 NOTE — PROGRESS NOTES
RECEIVING UNIT ED HANDOFF REVIEW    ED Nurse Handoff Report was reviewed by: Melissa Oneal RN on April 2, 2020 at 8:00 PM

## 2020-04-03 NOTE — PROGRESS NOTES
Spoke with patients daughter Saundra and BHAVYA Martinez from [facility] [phone] regarding plan of care. RN stated patient is a total care at the [facility] and they will take her back if COVID-19 is negative,they would not want to return there if patient is positive until sx resolved completely.  Discussed that patient is combative at times. Daughter stating recent addition og Depakote by her primary has been effective.  Plan is for patient to return to [facility] . SW to call main number [phone] when discharged.

## 2020-04-04 VITALS
DIASTOLIC BLOOD PRESSURE: 60 MMHG | RESPIRATION RATE: 16 BRPM | TEMPERATURE: 98.1 F | HEART RATE: 76 BPM | SYSTOLIC BLOOD PRESSURE: 117 MMHG | OXYGEN SATURATION: 96 %

## 2020-04-04 LAB
ANION GAP SERPL CALCULATED.3IONS-SCNC: 4 MMOL/L (ref 3–14)
BASOPHILS # BLD AUTO: 0 10E9/L (ref 0–0.2)
BASOPHILS NFR BLD AUTO: 0 %
BUN SERPL-MCNC: 13 MG/DL (ref 7–30)
CALCIUM SERPL-MCNC: 8 MG/DL (ref 8.5–10.1)
CHLORIDE SERPL-SCNC: 105 MMOL/L (ref 94–109)
CO2 SERPL-SCNC: 28 MMOL/L (ref 20–32)
CREAT SERPL-MCNC: 1.05 MG/DL (ref 0.52–1.04)
DIFFERENTIAL METHOD BLD: ABNORMAL
EOSINOPHIL # BLD AUTO: 0 10E9/L (ref 0–0.7)
EOSINOPHIL NFR BLD AUTO: 0 %
ERYTHROCYTE [DISTWIDTH] IN BLOOD BY AUTOMATED COUNT: 14.5 % (ref 10–15)
GFR SERPL CREATININE-BSD FRML MDRD: 51 ML/MIN/{1.73_M2}
GLUCOSE SERPL-MCNC: 87 MG/DL (ref 70–99)
HCT VFR BLD AUTO: 33.8 % (ref 35–47)
HGB BLD-MCNC: 11.1 G/DL (ref 11.7–15.7)
IMM GRANULOCYTES # BLD: 0 10E9/L (ref 0–0.4)
IMM GRANULOCYTES NFR BLD: 0.2 %
LYMPHOCYTES # BLD AUTO: 1.2 10E9/L (ref 0.8–5.3)
LYMPHOCYTES NFR BLD AUTO: 26.9 %
MCH RBC QN AUTO: 30.3 PG (ref 26.5–33)
MCHC RBC AUTO-ENTMCNC: 32.8 G/DL (ref 31.5–36.5)
MCV RBC AUTO: 92 FL (ref 78–100)
MONOCYTES # BLD AUTO: 0.5 10E9/L (ref 0–1.3)
MONOCYTES NFR BLD AUTO: 11.4 %
NEUTROPHILS # BLD AUTO: 2.7 10E9/L (ref 1.6–8.3)
NEUTROPHILS NFR BLD AUTO: 61.5 %
NRBC # BLD AUTO: 0 10*3/UL
NRBC BLD AUTO-RTO: 0 /100
PLATELET # BLD AUTO: 127 10E9/L (ref 150–450)
POTASSIUM SERPL-SCNC: 3.5 MMOL/L (ref 3.4–5.3)
RBC # BLD AUTO: 3.66 10E12/L (ref 3.8–5.2)
SODIUM SERPL-SCNC: 137 MMOL/L (ref 133–144)
WBC # BLD AUTO: 4.3 10E9/L (ref 4–11)

## 2020-04-04 PROCEDURE — 25800030 ZZH RX IP 258 OP 636: Performed by: INTERNAL MEDICINE

## 2020-04-04 PROCEDURE — 36415 COLL VENOUS BLD VENIPUNCTURE: CPT | Performed by: INTERNAL MEDICINE

## 2020-04-04 PROCEDURE — 85025 COMPLETE CBC W/AUTO DIFF WBC: CPT | Performed by: INTERNAL MEDICINE

## 2020-04-04 PROCEDURE — 25000132 ZZH RX MED GY IP 250 OP 250 PS 637: Mod: GY | Performed by: INTERNAL MEDICINE

## 2020-04-04 PROCEDURE — 99239 HOSP IP/OBS DSCHRG MGMT >30: CPT | Performed by: INTERNAL MEDICINE

## 2020-04-04 PROCEDURE — 80048 BASIC METABOLIC PNL TOTAL CA: CPT | Performed by: INTERNAL MEDICINE

## 2020-04-04 RX ORDER — HYDROCHLOROTHIAZIDE 12.5 MG/1
25 TABLET ORAL DAILY
COMMUNITY
Start: 2020-04-04

## 2020-04-04 RX ADMIN — PANTOPRAZOLE SODIUM 40 MG: 40 TABLET, DELAYED RELEASE ORAL at 08:56

## 2020-04-04 RX ADMIN — QUETIAPINE 12.5 MG: 25 TABLET, FILM COATED ORAL at 08:56

## 2020-04-04 RX ADMIN — SODIUM CHLORIDE: 9 INJECTION, SOLUTION INTRAVENOUS at 02:24

## 2020-04-04 RX ADMIN — DIVALPROEX SODIUM 250 MG: 125 CAPSULE, COATED PELLETS ORAL at 08:56

## 2020-04-04 RX ADMIN — ESCITALOPRAM 15 MG: 5 TABLET, FILM COATED ORAL at 08:56

## 2020-04-04 ASSESSMENT — ACTIVITIES OF DAILY LIVING (ADL)
ADLS_ACUITY_SCORE: 31

## 2020-04-04 NOTE — PLAN OF CARE
DATE & TIME: 4/3/20 1160-2973  Cognitive Concerns/ Orientation: Alert to self.  Confused (hx of dementia)  BEHAVIOR & AGGRESSION TOOL COLOR: Green -Yellow -- becomes agitated with cares at times.  CIWA SCORE: NA  ABNL VS/O2: VSS on RA  MOBILITY: Up A1-2; up in chair (sitter at bedside)  PAIN MANAGMENT: No nonverbal indicators of pain.   DIET: Regular, good appetite   BOWEL/BLADDER: Incontinent B/B  ABNL LAB/BG: Refused labs this AM. Labs from 4/2 Cr 1.65, WBC 3.4, Hgb 11.3.   DRAIN/DEVICES: R PIV infusing  ml/hr  TELEMETRY RHYTHM: NA  SKIN: Redness to coccyx  TESTS/PROCEDURES: COVID results came back negative.   D/C DAY/GOALS/PLACE: Most likely tomorrow. Facility will take Pt back if COVID results negative.   OTHER IMPORTANT INFO: Droplet and contact precautions maintained for now. Sitter at bedside for aggression with cares.

## 2020-04-04 NOTE — PLAN OF CARE
DATE & TIME: 4/4/20 NOC  Cognitive Concerns/ Orientation: Alert to self.  Confused (hx of dementia)  BEHAVIOR & AGGRESSION TOOL COLOR: Green at rest -Yellow during cares.  CIWA SCORE: NA  ABNL VS/O2: VSS on RA  MOBILITY: Up A1-2; (sitter at bedside)  PAIN MANAGMENT: No nonverbal indicators of pain.   DIET: Regular,   BOWEL/BLADDER: Incontinent B/B  ABNL LAB/BG: Refused labs yesterday AM.   DRAIN/DEVICES: R PIV infusing  ml/hr  TELEMETRY RHYTHM: NA  SKIN: Redness to coccyx  TESTS/PROCEDURES: COVID results came back negative.   D/C DAY/GOALS/PLACE: Most likely today Facility will take Pt back if COVID results negative.   OTHER IMPORTANT INFO: Droplet and contact precautions maintained for now. Sitter at bedside for aggression with cares/confusion.

## 2020-04-04 NOTE — CONSULTS
Care Coordination:    Following for discharge planning.  Pt is covid negative and per notes can return to [facility].  Called and spoke with Kacie ([phone]) who confirms pt can return there today.  Fax orders to [phone].Any time is fine for return. (orders faxed at 12:00)  Spoke with son Gentry ( [phone]) who would like transport set up.  Paged Dr. Strickland to get orders  Bedside nurse updated.   CC to arrange transport with [facility].   Ride arranged for 1500 on 4/4.  PCS on chart.          aPm Skinner RN BSN  Inpatient Care Coordination  [facility]  [phone]

## 2020-04-04 NOTE — DISCHARGE SUMMARY
[facility]    Discharge Summary  Hospitalist    Date of Admission:  4/2/2020  Date of Discharge:  4/4/2020  3:25 PM  Discharging Provider: Hannah Strickland MD    Discharge Diagnoses   covid rule out  History of Present Illness   Please review admission h and p .    Hospital Course   Desiree Silva was admitted on 4/2/2020.  The following problems were addressed during her hospitalization:    Active Problems:    Fever    2019 novel coronavirus disease (COVID-19)    Desiree Silva is a 80 year old female who was admitted on 4/2/2020.  Presenting for fever evaluation from a [facility] facility, currently [place] VID rule out, test pending .  Fever   ---no fever here, suspicion low, results of covid pcr was negative . chest x ray  Is negative .  Acute renal insufficiency   Creatinine improved following admission and hydration.  Her creatinine was 1.05 on discharge.      Hannah Strickland MD, MD    Significant Results and Procedures       Pending Results   These results will be followed up by primary care physician   Unresulted Labs Ordered in the Past 30 Days of this Admission     Date and Time Order Name Status Description    4/2/2020 1631 Blood culture Preliminary     4/2/2020 1631 Blood culture Preliminary           Code Status   DNR / DNI       Primary Care Physician   Radha Harper    Physical Exam   Temp: 98.1  F (36.7  C) Temp src: Axillary BP: 117/60 Pulse: 76 Heart Rate: 77 Resp: 16 SpO2: 96 % O2 Device: None (Room air)    There were no vitals filed for this visit.  Vital Signs with Ranges  Temp:  [97.7  F (36.5  C)-98.5  F (36.9  C)] 98.1  F (36.7  C)  Pulse:  [64-76] 76  Heart Rate:  [63-77] 77  Resp:  [16] 16  BP: (111-147)/(59-82) 117/60  SpO2:  [93 %-99 %] 96 %  I/O last 3 completed shifts:  In: 2541 [P.O.:660; I.V.:1881]  Out: -     The patient was examined on the day of discharge.    Discharge Disposition   Discharged to nursing home  Condition at discharge: Stable    Consultations This  Hospital Stay   CARE COORDINATOR IP CONSULT    Time Spent on this Encounter   I, Hannah Strickland MD, personally saw the patient today and spent greater than 30 minutes discharging this patient.    Discharge Orders      Reason for your hospital stay    fever     Follow-up and recommended labs and tests     Follow up with primary care provider, Radha Harper, within 7 days for hospital follow- up.  The following labs/tests are recommended: basic metabolic panel in 2-3 days.     Activity    Your activity upon discharge: activity as tolerated     DNR/DNI     Diet    Follow this diet upon discharge: Orders Placed This Encounter      Regular Diet Adult     Discharge Medications   Current Discharge Medication List      CONTINUE these medications which have CHANGED    Details   hydrochlorothiazide (HYDRODIURIL) 12.5 MG tablet Take 2 tablets (25 mg) by mouth daily  Qty:      Comments: Restart in 2-3 days once basic metabolic panel is done  Associated Diagnoses: Essential hypertension         CONTINUE these medications which have NOT CHANGED    Details   acetaminophen (TYLENOL) 325 MG tablet Take 650 mg by mouth every 4 hours as needed for mild pain      ammonium lactate (LAC-HYDRIN) 12 % external lotion Apply topically daily Apply to leg(s)      divalproex sodium delayed-release (DEPAKOTE SPRINKLE) 125 MG DR capsule Take 250 mg by mouth 2 times daily      escitalopram (LEXAPRO) 10 MG tablet Take 15 mg by mouth daily      miconazole (MICATIN/MICRO GUARD) 2 % external powder Apply topically 2 times daily as needed for other (topical candidiasis)  Refills: 0    Associated Diagnoses: Tinea corporis      pantoprazole (PROTONIX) 40 MG EC tablet Take 40 mg by mouth daily      !! QUEtiapine (SEROQUEL) 25 MG tablet Take 12.5 mg by mouth 2 times daily      !! QUEtiapine (SEROQUEL) 25 MG tablet Take 12.5 mg by mouth daily as needed      vitamin D3 (CHOLECALCIFEROL) 2000 units (50 mcg) tablet Take 2,000 Units by mouth daily       !! -  Potential duplicate medications found. Please discuss with provider.        Allergies   No Known Allergies  Data   Most Recent 3 CBC's:  Recent Labs   Lab Test 04/04/20  0912 04/02/20  1631 11/04/19  1240 11/02/19  1141   WBC 4.3 3.4*  --  6.1   HGB 11.1* 11.3* 10.0* 10.8*   MCV 92 94  --  95   * 136*  --  245      Most Recent 3 BMP's:  Recent Labs   Lab Test 04/04/20  0912 04/02/20  1631 11/04/19  1240 11/03/19  1258    141  --  137   POTASSIUM 3.5 3.5  --  3.9   CHLORIDE 105 105  --  105   CO2 28 31  --  29   BUN 13 32*  --  33*   CR 1.05* 1.65* 1.13* 1.31*   ANIONGAP 4 5  --  3   DALILA 8.0* 8.4*  --  8.3*   GLC 87 90  --  101*     Most Recent 2 LFT's:  Recent Labs   Lab Test 04/02/20  1631 01/20/14  1235   AST 18 28   ALT 15 28   ALKPHOS 49 69   BILITOTAL 0.4 0.5     Most Recent INR's and Anticoagulation Dosing History:  Anticoagulation Dose History     Recent Dosing and Labs Latest Ref Rng & Units 9/8/2006 9/9/2006 1/27/2014 1/28/2014 1/29/2014 1/30/2014 1/31/2014    Warfarin 3 mg - - - 3 mg 3 mg 3 mg - -    Warfarin 5 mg - - - - - - 5 mg -    INR 0.86 - 1.14 1.83(H) 1.70(H) - 1.14 1.40(H) 1.25(H) 1.31(H)        Most Recent 3 Troponin's:  Recent Labs   Lab Test 11/02/19  1141   TROPI <0.015     Most Recent Cholesterol Panel:No lab results found.  Most Recent 6 Bacteria Isolates From Any Culture (See EPIC Reports for Culture Details):  Recent Labs   Lab Test 04/02/20  1735 04/02/20  1632 09/25/14  1400 01/28/14  1648 01/28/14  1624   CULT No growth after 4 days No growth after 4 days On day 2, isolated in broth only: Coagulase negative Staphylococcus  Critical Value/Significant Value, preliminary result only, called to and read   back by Dr. Jam Ward @5214 on 9/27/14. cms.  * No growth No growth     Most Recent TSH, T4 and A1c Labs:No lab results found.  Results for orders placed or performed during the hospital encounter of 04/02/20   XR Chest Port 1 View    Narrative    CHEST ONE VIEW PORTABLE    4/2/2020 5:02 PM     HISTORY: Fever    COMPARISON: None.      Impression    IMPRESSION: No airspace consolidation, pneumothorax, or pleural  effusion.    LINDA MENDOZA MD

## 2020-04-04 NOTE — PLAN OF CARE
DATE & TIME: 4/4/20 ([id number])  Cognitive Concerns/ Orientation: Alert to self.  Confused (hx of dementia)  BEHAVIOR & AGGRESSION TOOL COLOR: Green at rest -Yellow during cares.  CIWA SCORE: NA  ABNL VS/O2: VSS on RA  MOBILITY: Up A1-2; (sitter at bedside)  PAIN MANAGMENT: No nonverbal indicators of pain.   DIET: Regular,   BOWEL/BLADDER: Incontinent B/B  ABNL LAB/BG: CR. 1.05  DRAIN/DEVICES: IV discontinued at discharge  TELEMETRY RHYTHM: NA  SKIN: Redness to coccyx  TESTS/PROCEDURES: COVID results came back negative.   D/C DAY/GOALS/PLACE: Today at [id number] back to memory care.  OTHER IMPORTANT INFO: Droplet and contact precautions discontinued. Sitter at bedside for aggression with cares.

## 2020-04-07 ENCOUNTER — RECORDS - HEALTHEAST (OUTPATIENT)
Dept: LAB | Facility: CLINIC | Age: 80
End: 2020-04-07

## 2020-04-07 LAB
ALBUMIN SERPL-MCNC: 2.1 G/DL (ref 3.5–5)
ANION GAP SERPL CALCULATED.3IONS-SCNC: 12 MMOL/L (ref 5–18)
BASOPHILS # BLD AUTO: 0 THOU/UL (ref 0–0.2)
BASOPHILS NFR BLD AUTO: 0 % (ref 0–2)
BUN SERPL-MCNC: 21 MG/DL (ref 8–28)
CALCIUM SERPL-MCNC: 7.7 MG/DL (ref 8.5–10.5)
CHLORIDE BLD-SCNC: 107 MMOL/L (ref 98–107)
CO2 SERPL-SCNC: 26 MMOL/L (ref 22–31)
CREAT SERPL-MCNC: 1.23 MG/DL (ref 0.6–1.1)
EOSINOPHIL # BLD AUTO: 0 THOU/UL (ref 0–0.4)
EOSINOPHIL NFR BLD AUTO: 1 % (ref 0–6)
ERYTHROCYTE [DISTWIDTH] IN BLOOD BY AUTOMATED COUNT: 14.8 % (ref 11–14.5)
GFR SERPL CREATININE-BSD FRML MDRD: 42 ML/MIN/1.73M2
GLUCOSE BLD-MCNC: 74 MG/DL (ref 70–125)
HCT VFR BLD AUTO: 31.1 % (ref 35–47)
HGB BLD-MCNC: 9.9 G/DL (ref 12–16)
LYMPHOCYTES # BLD AUTO: 0.8 THOU/UL (ref 0.8–4.4)
LYMPHOCYTES NFR BLD AUTO: 25 % (ref 20–40)
MCH RBC QN AUTO: 30.1 PG (ref 27–34)
MCHC RBC AUTO-ENTMCNC: 31.8 G/DL (ref 32–36)
MCV RBC AUTO: 95 FL (ref 80–100)
MONOCYTES # BLD AUTO: 0.5 THOU/UL (ref 0–0.9)
MONOCYTES NFR BLD AUTO: 16 % (ref 2–10)
NEUTROPHILS # BLD AUTO: 1.9 THOU/UL (ref 2–7.7)
NEUTROPHILS NFR BLD AUTO: 57 % (ref 50–70)
PHOSPHATE SERPL-MCNC: 3.2 MG/DL (ref 2.5–4.5)
PLATELET # BLD AUTO: 136 THOU/UL (ref 140–440)
PMV BLD AUTO: 10.9 FL (ref 8.5–12.5)
POTASSIUM BLD-SCNC: 3.1 MMOL/L (ref 3.5–5)
RBC # BLD AUTO: 3.29 MILL/UL (ref 3.8–5.4)
SODIUM SERPL-SCNC: 145 MMOL/L (ref 136–145)
WBC: 3.3 THOU/UL (ref 4–11)

## 2020-04-08 LAB
BACTERIA SPEC CULT: NO GROWTH
BACTERIA SPEC CULT: NO GROWTH
SPECIMEN SOURCE: NORMAL
SPECIMEN SOURCE: NORMAL